# Patient Record
Sex: FEMALE | Race: WHITE
[De-identification: names, ages, dates, MRNs, and addresses within clinical notes are randomized per-mention and may not be internally consistent; named-entity substitution may affect disease eponyms.]

---

## 2017-01-09 ENCOUNTER — APPOINTMENT (OUTPATIENT)
Dept: OPHTHALMOLOGY | Facility: CLINIC | Age: 37
End: 2017-01-09

## 2017-02-10 ENCOUNTER — APPOINTMENT (OUTPATIENT)
Dept: BARIATRICS | Facility: CLINIC | Age: 37
End: 2017-02-10

## 2017-09-14 ENCOUNTER — APPOINTMENT (OUTPATIENT)
Dept: INTERNAL MEDICINE | Facility: CLINIC | Age: 37
End: 2017-09-14

## 2017-09-19 ENCOUNTER — APPOINTMENT (OUTPATIENT)
Dept: ORTHOPEDIC SURGERY | Facility: CLINIC | Age: 37
End: 2017-09-19

## 2018-06-26 ENCOUNTER — APPOINTMENT (OUTPATIENT)
Dept: OBGYN | Facility: CLINIC | Age: 38
End: 2018-06-26
Payer: COMMERCIAL

## 2018-06-26 VITALS
HEIGHT: 66 IN | OXYGEN SATURATION: 99 % | HEART RATE: 59 BPM | WEIGHT: 206 LBS | BODY MASS INDEX: 33.11 KG/M2 | RESPIRATION RATE: 16 BRPM

## 2018-06-26 DIAGNOSIS — Z31.89 ENCOUNTER FOR OTHER PROCREATIVE MANAGEMENT: ICD-10-CM

## 2018-06-26 DIAGNOSIS — N84.1 POLYP OF CERVIX UTERI: ICD-10-CM

## 2018-06-26 PROCEDURE — 57500 BIOPSY OF CERVIX: CPT

## 2018-06-26 PROCEDURE — 99385 PREV VISIT NEW AGE 18-39: CPT | Mod: 25

## 2018-06-26 RX ORDER — BUPROPION HYDROCHLORIDE 100 MG/1
100 TABLET, FILM COATED ORAL
Refills: 0 | Status: ACTIVE | COMMUNITY

## 2018-06-27 LAB
C TRACH RRNA SPEC QL NAA+PROBE: NOT DETECTED
HPV HIGH+LOW RISK DNA PNL CVX: NOT DETECTED
N GONORRHOEA RRNA SPEC QL NAA+PROBE: NOT DETECTED
SOURCE TP AMPLIFICATION: NORMAL

## 2018-06-28 LAB — CORE LAB BIOPSY: NORMAL

## 2018-06-29 ENCOUNTER — OTHER (OUTPATIENT)
Age: 38
End: 2018-06-29

## 2018-06-29 LAB — CYTOLOGY CVX/VAG DOC THIN PREP: NORMAL

## 2018-07-17 ENCOUNTER — APPOINTMENT (OUTPATIENT)
Dept: BARIATRICS | Facility: CLINIC | Age: 38
End: 2018-07-17

## 2018-07-27 ENCOUNTER — APPOINTMENT (OUTPATIENT)
Dept: BARIATRICS | Facility: CLINIC | Age: 38
End: 2018-07-27

## 2018-08-02 ENCOUNTER — APPOINTMENT (OUTPATIENT)
Dept: HUMAN REPRODUCTION | Facility: CLINIC | Age: 38
End: 2018-08-02
Payer: COMMERCIAL

## 2018-08-02 PROCEDURE — 36415 COLL VENOUS BLD VENIPUNCTURE: CPT

## 2018-08-02 PROCEDURE — 99205 OFFICE O/P NEW HI 60 MIN: CPT

## 2018-08-02 PROCEDURE — 76830 TRANSVAGINAL US NON-OB: CPT

## 2018-09-17 ENCOUNTER — APPOINTMENT (OUTPATIENT)
Dept: HUMAN REPRODUCTION | Facility: CLINIC | Age: 38
End: 2018-09-17
Payer: COMMERCIAL

## 2018-09-17 DIAGNOSIS — N97.9 FEMALE INFERTILITY, UNSPECIFIED: ICD-10-CM

## 2018-09-17 PROCEDURE — 99215 OFFICE O/P EST HI 40 MIN: CPT

## 2018-09-27 PROBLEM — N97.9 FEMALE INFERTILITY: Status: ACTIVE | Noted: 2018-09-27

## 2018-11-06 ENCOUNTER — APPOINTMENT (OUTPATIENT)
Dept: BARIATRICS/WEIGHT MGMT | Facility: CLINIC | Age: 38
End: 2018-11-06
Payer: COMMERCIAL

## 2018-11-06 VITALS — HEIGHT: 66 IN | BODY MASS INDEX: 33.75 KG/M2 | WEIGHT: 210 LBS

## 2018-11-06 DIAGNOSIS — E66.9 OBESITY, UNSPECIFIED: ICD-10-CM

## 2018-11-06 DIAGNOSIS — Z98.84 BARIATRIC SURGERY STATUS: ICD-10-CM

## 2018-11-06 PROCEDURE — 97802 MEDICAL NUTRITION INDIV IN: CPT

## 2019-01-29 ENCOUNTER — APPOINTMENT (OUTPATIENT)
Dept: BARIATRICS | Facility: CLINIC | Age: 39
End: 2019-01-29

## 2019-05-14 ENCOUNTER — OUTPATIENT (OUTPATIENT)
Dept: OUTPATIENT SERVICES | Facility: HOSPITAL | Age: 39
LOS: 1 days | Discharge: HOME | End: 2019-05-14

## 2019-05-14 VITALS — WEIGHT: 207.9 LBS | HEIGHT: 65 IN

## 2019-05-14 DIAGNOSIS — E66.01 MORBID (SEVERE) OBESITY DUE TO EXCESS CALORIES: ICD-10-CM

## 2019-05-14 DIAGNOSIS — Z98.84 BARIATRIC SURGERY STATUS: Chronic | ICD-10-CM

## 2019-05-14 DIAGNOSIS — K21.9 GASTRO-ESOPHAGEAL REFLUX DISEASE WITHOUT ESOPHAGITIS: ICD-10-CM

## 2019-05-14 LAB
ANION GAP SERPL CALC-SCNC: 4 MMOL/L — LOW (ref 5–17)
APPEARANCE UR: CLEAR — SIGNIFICANT CHANGE UP
APTT BLD: 32.8 SEC — SIGNIFICANT CHANGE UP (ref 27.5–36.3)
BASOPHILS # BLD AUTO: 0.05 K/UL — SIGNIFICANT CHANGE UP (ref 0–0.2)
BASOPHILS NFR BLD AUTO: 0.9 % — SIGNIFICANT CHANGE UP (ref 0–2)
BILIRUB UR-MCNC: NEGATIVE — SIGNIFICANT CHANGE UP
BUN SERPL-MCNC: 10 MG/DL — SIGNIFICANT CHANGE UP (ref 7–23)
CALCIUM SERPL-MCNC: 8.7 MG/DL — SIGNIFICANT CHANGE UP (ref 8.5–10.1)
CHLORIDE SERPL-SCNC: 105 MMOL/L — SIGNIFICANT CHANGE UP (ref 96–108)
CO2 SERPL-SCNC: 27 MMOL/L — SIGNIFICANT CHANGE UP (ref 22–31)
COLOR SPEC: YELLOW — SIGNIFICANT CHANGE UP
CREAT SERPL-MCNC: 0.76 MG/DL — SIGNIFICANT CHANGE UP (ref 0.5–1.3)
DIFF PNL FLD: NEGATIVE — SIGNIFICANT CHANGE UP
EOSINOPHIL # BLD AUTO: 0.08 K/UL — SIGNIFICANT CHANGE UP (ref 0–0.5)
EOSINOPHIL NFR BLD AUTO: 1.5 % — SIGNIFICANT CHANGE UP (ref 0–6)
GLUCOSE SERPL-MCNC: 81 MG/DL — SIGNIFICANT CHANGE UP (ref 70–99)
GLUCOSE UR QL: NEGATIVE MG/DL — SIGNIFICANT CHANGE UP
HCT VFR BLD CALC: 38.3 % — SIGNIFICANT CHANGE UP (ref 34.5–45)
HGB BLD-MCNC: 13.1 G/DL — SIGNIFICANT CHANGE UP (ref 11.5–15.5)
IMM GRANULOCYTES NFR BLD AUTO: 0.2 % — SIGNIFICANT CHANGE UP (ref 0–1.5)
INR BLD: 1.01 RATIO — SIGNIFICANT CHANGE UP (ref 0.88–1.16)
KETONES UR-MCNC: NEGATIVE — SIGNIFICANT CHANGE UP
LEUKOCYTE ESTERASE UR-ACNC: NEGATIVE — SIGNIFICANT CHANGE UP
LYMPHOCYTES # BLD AUTO: 2.62 K/UL — SIGNIFICANT CHANGE UP (ref 1–3.3)
LYMPHOCYTES # BLD AUTO: 49.2 % — HIGH (ref 13–44)
MCHC RBC-ENTMCNC: 30.5 PG — SIGNIFICANT CHANGE UP (ref 27–34)
MCHC RBC-ENTMCNC: 34.2 GM/DL — SIGNIFICANT CHANGE UP (ref 32–36)
MCV RBC AUTO: 89.1 FL — SIGNIFICANT CHANGE UP (ref 80–100)
MONOCYTES # BLD AUTO: 0.5 K/UL — SIGNIFICANT CHANGE UP (ref 0–0.9)
MONOCYTES NFR BLD AUTO: 9.4 % — SIGNIFICANT CHANGE UP (ref 2–14)
NEUTROPHILS # BLD AUTO: 2.06 K/UL — SIGNIFICANT CHANGE UP (ref 1.8–7.4)
NEUTROPHILS NFR BLD AUTO: 38.8 % — LOW (ref 43–77)
NITRITE UR-MCNC: NEGATIVE — SIGNIFICANT CHANGE UP
PH UR: 8 — SIGNIFICANT CHANGE UP (ref 5–8)
PLATELET # BLD AUTO: 289 K/UL — SIGNIFICANT CHANGE UP (ref 150–400)
POTASSIUM SERPL-MCNC: 4.3 MMOL/L — SIGNIFICANT CHANGE UP (ref 3.5–5.3)
POTASSIUM SERPL-SCNC: 4.3 MMOL/L — SIGNIFICANT CHANGE UP (ref 3.5–5.3)
PROT UR-MCNC: NEGATIVE MG/DL — SIGNIFICANT CHANGE UP
PROTHROM AB SERPL-ACNC: 11.2 SEC — SIGNIFICANT CHANGE UP (ref 10–12.9)
RBC # BLD: 4.3 M/UL — SIGNIFICANT CHANGE UP (ref 3.8–5.2)
RBC # FLD: 13.4 % — SIGNIFICANT CHANGE UP (ref 10.3–14.5)
SODIUM SERPL-SCNC: 136 MMOL/L — SIGNIFICANT CHANGE UP (ref 135–145)
SP GR SPEC: 1.01 — SIGNIFICANT CHANGE UP (ref 1.01–1.02)
UROBILINOGEN FLD QL: NEGATIVE MG/DL — SIGNIFICANT CHANGE UP
WBC # BLD: 5.32 K/UL — SIGNIFICANT CHANGE UP (ref 3.8–10.5)
WBC # FLD AUTO: 5.32 K/UL — SIGNIFICANT CHANGE UP (ref 3.8–10.5)

## 2019-05-14 NOTE — H&P PST ADULT - NSANTHOSAYNRD_GEN_A_CORE
No. ROSHAN screening performed.  STOP BANG Legend: 0-2 = LOW Risk; 3-4 = INTERMEDIATE Risk; 5-8 = HIGH Risk

## 2019-05-14 NOTE — H&P PST ADULT - ASSESSMENT
37 y/o female with history of morbid obesity, DM, S/P sleeve gastrectomy in 2013. Complain of heartburn, regurgitation of bitter acid. Scheduled for upper endoscopy.     Plan:    1. NPO post midnight  2. Labs, EKG as per Dr. Conteh  3. STAT urine pregnancy on admission

## 2019-05-14 NOTE — H&P PST ADULT - NSICDXPASTMEDICALHX_GEN_ALL_CORE_FT
PAST MEDICAL HISTORY:  DM (diabetes mellitus)     GERD (gastroesophageal reflux disease) PAST MEDICAL HISTORY:  DM (diabetes mellitus)     GERD (gastroesophageal reflux disease)     Morbid obesity

## 2019-05-14 NOTE — H&P PST ADULT - HISTORY OF PRESENT ILLNESS
39 y/o female with history of morbid obesity, DM, S/P sleeve gastrectomy in 2013. Complain of heartburn, regurgitation of bitter acid. Scheduled for upper endoscopy.

## 2019-05-14 NOTE — H&P PST ADULT - NSICDXPASTSURGICALHX_GEN_ALL_CORE_FT
PAST SURGICAL HISTORY:       Hyperhydrosis disorder s/p bilat. sympathectomy     S/P Arthroscopic Knee Surgery - right knee ACL and MCL reconstruction  &      S/P laparoscopic sleeve gastrectomy 2013

## 2019-05-24 ENCOUNTER — RESULT REVIEW (OUTPATIENT)
Age: 39
End: 2019-05-24

## 2019-05-24 ENCOUNTER — OUTPATIENT (OUTPATIENT)
Dept: OUTPATIENT SERVICES | Facility: HOSPITAL | Age: 39
LOS: 1 days | Discharge: HOME | End: 2019-05-24
Payer: COMMERCIAL

## 2019-05-24 VITALS
HEIGHT: 65 IN | TEMPERATURE: 98 F | DIASTOLIC BLOOD PRESSURE: 82 MMHG | OXYGEN SATURATION: 100 % | RESPIRATION RATE: 16 BRPM | SYSTOLIC BLOOD PRESSURE: 122 MMHG | WEIGHT: 210.1 LBS | HEART RATE: 49 BPM

## 2019-05-24 DIAGNOSIS — Z98.84 BARIATRIC SURGERY STATUS: Chronic | ICD-10-CM

## 2019-05-24 LAB — HCG UR QL: NEGATIVE — SIGNIFICANT CHANGE UP

## 2019-05-24 PROCEDURE — 88313 SPECIAL STAINS GROUP 2: CPT | Mod: 26

## 2019-05-24 PROCEDURE — 88312 SPECIAL STAINS GROUP 1: CPT | Mod: 26

## 2019-05-24 PROCEDURE — 88305 TISSUE EXAM BY PATHOLOGIST: CPT | Mod: 26

## 2019-05-24 NOTE — ASU PATIENT PROFILE, ADULT - PSH
Hyperhydrosis disorder  s/p bilat. sympathectomy   S/P Arthroscopic Knee Surgery - right knee ACL and MCL reconstruction  &     S/P laparoscopic sleeve gastrectomy  2013

## 2019-05-24 NOTE — ASU PATIENT PROFILE, ADULT - NS PRO ABUSE SCREEN AFRAID ANYONE YN
[facility]  [address] [address]  Phone: [phone]    July 6, 2017      Dorina Lee  [address] [place] [id number]        Dear Ms. Lee    For medical reasons you should be excused from work on 7-6-17 and 7-7-17.     Sincerely,    / Anirudh Gay MD   no

## 2019-05-29 LAB — SURGICAL PATHOLOGY STUDY: SIGNIFICANT CHANGE UP

## 2019-05-30 DIAGNOSIS — E11.9 TYPE 2 DIABETES MELLITUS WITHOUT COMPLICATIONS: ICD-10-CM

## 2019-05-30 DIAGNOSIS — K29.70 GASTRITIS, UNSPECIFIED, WITHOUT BLEEDING: ICD-10-CM

## 2019-05-30 DIAGNOSIS — Z98.84 BARIATRIC SURGERY STATUS: ICD-10-CM

## 2019-05-30 DIAGNOSIS — K21.9 GASTRO-ESOPHAGEAL REFLUX DISEASE WITHOUT ESOPHAGITIS: ICD-10-CM

## 2019-05-30 DIAGNOSIS — E66.01 MORBID (SEVERE) OBESITY DUE TO EXCESS CALORIES: ICD-10-CM

## 2019-05-30 DIAGNOSIS — Z87.891 PERSONAL HISTORY OF NICOTINE DEPENDENCE: ICD-10-CM

## 2019-06-07 PROBLEM — K21.9 GASTRO-ESOPHAGEAL REFLUX DISEASE WITHOUT ESOPHAGITIS: Chronic | Status: ACTIVE | Noted: 2019-05-14

## 2019-07-02 ENCOUNTER — APPOINTMENT (OUTPATIENT)
Dept: PULMONOLOGY | Facility: CLINIC | Age: 39
End: 2019-07-02
Payer: COMMERCIAL

## 2019-07-02 VITALS
HEART RATE: 57 BPM | SYSTOLIC BLOOD PRESSURE: 122 MMHG | WEIGHT: 202 LBS | BODY MASS INDEX: 32.47 KG/M2 | HEIGHT: 66 IN | DIASTOLIC BLOOD PRESSURE: 72 MMHG | OXYGEN SATURATION: 100 %

## 2019-07-02 DIAGNOSIS — Z01.811 ENCOUNTER FOR PREPROCEDURAL RESPIRATORY EXAMINATION: ICD-10-CM

## 2019-07-02 PROCEDURE — 94727 GAS DIL/WSHOT DETER LNG VOL: CPT | Mod: 59

## 2019-07-02 PROCEDURE — 94060 EVALUATION OF WHEEZING: CPT | Mod: 59

## 2019-07-02 PROCEDURE — 99203 OFFICE O/P NEW LOW 30 MIN: CPT | Mod: 25

## 2019-07-02 PROCEDURE — 94729 DIFFUSING CAPACITY: CPT | Mod: 59

## 2019-07-02 RX ORDER — LEUPROLIDE ACETATE 1 MG/0.2ML
1 KIT SUBCUTANEOUS
Qty: 1 | Refills: 1 | Status: DISCONTINUED | COMMUNITY
Start: 2018-09-27 | End: 2019-07-02

## 2019-07-02 RX ORDER — FOLLITROPIN ALFA 1050 UNIT
1050 KIT SUBCUTANEOUS
Qty: 3 | Refills: 1 | Status: DISCONTINUED | COMMUNITY
Start: 2018-09-27 | End: 2019-07-02

## 2019-07-02 RX ORDER — CHORIONIC GONADOTROPIN 10000 UNIT
10000 KIT INTRAMUSCULAR
Qty: 1 | Refills: 0 | Status: DISCONTINUED | COMMUNITY
Start: 2018-09-27 | End: 2019-07-02

## 2019-07-02 RX ORDER — NEEDLES, DISPOSABLE 25GX5/8"
27G X 1/2" NEEDLE, DISPOSABLE MISCELLANEOUS
Qty: 15 | Refills: 0 | Status: DISCONTINUED | COMMUNITY
Start: 2018-09-27 | End: 2019-07-02

## 2019-07-02 RX ORDER — SYRINGE AND NEEDLE,INSULIN,1ML 31 GX5/16"
31G X 5/16" SYRINGE, EMPTY DISPOSABLE MISCELLANEOUS
Qty: 30 | Refills: 0 | Status: DISCONTINUED | COMMUNITY
Start: 2018-09-27 | End: 2019-07-02

## 2019-07-02 RX ORDER — CONTAINER,EMPTY
EACH MISCELLANEOUS
Qty: 1 | Refills: 2 | Status: DISCONTINUED | COMMUNITY
Start: 2018-09-27 | End: 2019-07-02

## 2019-07-02 RX ORDER — SYRINGE WITH NEEDLE, 1 ML 25GX5/8"
22G X 1-1/2" SYRINGE, EMPTY DISPOSABLE MISCELLANEOUS
Qty: 20 | Refills: 1 | Status: DISCONTINUED | COMMUNITY
Start: 2018-09-27 | End: 2019-07-02

## 2019-07-02 RX ORDER — MENOTROPINS 75 UNIT
75 KIT SUBCUTANEOUS
Qty: 40 | Refills: 1 | Status: DISCONTINUED | COMMUNITY
Start: 2018-09-27 | End: 2019-07-02

## 2019-07-02 RX ORDER — DOXYCYCLINE HYCLATE 100 MG/1
100 CAPSULE ORAL TWICE DAILY
Qty: 10 | Refills: 0 | Status: DISCONTINUED | COMMUNITY
Start: 2018-09-27 | End: 2019-07-02

## 2019-07-02 NOTE — REASON FOR VISIT
[Consultation] : a consultation visit [FreeTextEntry1] : Preoperative evaluation prior to bariatric surgery

## 2019-07-02 NOTE — REVIEW OF SYSTEMS
[Recent Wt Gain (___ Lbs)] : recent [unfilled] ~Ulb weight gain [Reflux] : reflux [Negative] : Sleep Disorder

## 2019-07-02 NOTE — DISCUSSION/SUMMARY
[FreeTextEntry1] : Based on the history, examination and pulmonary function testing the patient is cleared for the proposed bariatric surgery from pulmonary point of view.

## 2019-07-02 NOTE — HISTORY OF PRESENT ILLNESS
[FreeTextEntry1] : The patient is a 38-year-old lady with history of obesity here for preoperative evaluation prior to bariatric surgery. The patient has history of having had a prior bariatric surgery. Recently she's been gaining the weight back.\par The patient smoked half pack a day for 8 years and quit smoking in 2007. She is a runner. She runs 4 miles a day. There is no history of shortness of breath or cough.\par She has gained about 30 pounds of weight in the last 3-4 years.\par The patient sleeps for about 6-7 hours every night. There is no history of snoring. There is no history of witnessed apnea. There is history of sleep talking.\par The patient has history significant for gastroesophageal reflux disease.\par She works as an  for the local hospital.

## 2019-07-02 NOTE — PHYSICAL EXAM
[General Appearance - Well Developed] : well developed [Normal Appearance] : normal appearance [Well Groomed] : well groomed [General Appearance - Well Nourished] : well nourished [No Deformities] : no deformities [General Appearance - In No Acute Distress] : no acute distress [Normal Conjunctiva] : the conjunctiva exhibited no abnormalities [Eyelids - No Xanthelasma] : the eyelids demonstrated no xanthelasmas [Normal Oropharynx] : normal oropharynx [Neck Appearance] : the appearance of the neck was normal [Neck Cervical Mass (___cm)] : no neck mass was observed [Jugular Venous Distention Increased] : there was no jugular-venous distention [Thyroid Diffuse Enlargement] : the thyroid was not enlarged [Thyroid Nodule] : there were no palpable thyroid nodules [Heart Rate And Rhythm] : heart rate and rhythm were normal [Heart Sounds] : normal S1 and S2 [Murmurs] : no murmurs present [Respiration, Rhythm And Depth] : normal respiratory rhythm and effort [Exaggerated Use Of Accessory Muscles For Inspiration] : no accessory muscle use [Auscultation Breath Sounds / Voice Sounds] : lungs were clear to auscultation bilaterally [Abdomen Soft] : soft [Abdomen Tenderness] : non-tender [Abdomen Mass (___ Cm)] : no abdominal mass palpated [Abnormal Walk] : normal gait [Gait - Sufficient For Exercise Testing] : the gait was sufficient for exercise testing [Nail Clubbing] : no clubbing of the fingernails [Cyanosis, Localized] : no localized cyanosis [Petechial Hemorrhages (___cm)] : no petechial hemorrhages [Skin Color & Pigmentation] : normal skin color and pigmentation [Skin Turgor] : normal skin turgor [] : no rash [Deep Tendon Reflexes (DTR)] : deep tendon reflexes were 2+ and symmetric [No Focal Deficits] : no focal deficits [Sensation] : the sensory exam was normal to light touch and pinprick [Oriented To Time, Place, And Person] : oriented to person, place, and time [Impaired Insight] : insight and judgment were intact [Affect] : the affect was normal

## 2019-08-02 ENCOUNTER — OUTPATIENT (OUTPATIENT)
Dept: OUTPATIENT SERVICES | Facility: HOSPITAL | Age: 39
LOS: 1 days | End: 2019-08-02
Payer: COMMERCIAL

## 2019-08-02 VITALS
HEIGHT: 65.5 IN | WEIGHT: 195.99 LBS | OXYGEN SATURATION: 99 % | TEMPERATURE: 98 F | HEART RATE: 76 BPM | SYSTOLIC BLOOD PRESSURE: 120 MMHG | RESPIRATION RATE: 14 BRPM | DIASTOLIC BLOOD PRESSURE: 78 MMHG

## 2019-08-02 DIAGNOSIS — E66.01 MORBID (SEVERE) OBESITY DUE TO EXCESS CALORIES: ICD-10-CM

## 2019-08-02 DIAGNOSIS — Z98.890 OTHER SPECIFIED POSTPROCEDURAL STATES: Chronic | ICD-10-CM

## 2019-08-02 DIAGNOSIS — Z29.9 ENCOUNTER FOR PROPHYLACTIC MEASURES, UNSPECIFIED: ICD-10-CM

## 2019-08-02 DIAGNOSIS — Z98.84 BARIATRIC SURGERY STATUS: Chronic | ICD-10-CM

## 2019-08-02 LAB
ALBUMIN SERPL ELPH-MCNC: 4.2 G/DL — SIGNIFICANT CHANGE UP (ref 3.3–5)
ANION GAP SERPL CALC-SCNC: 5 MMOL/L — SIGNIFICANT CHANGE UP (ref 5–17)
APPEARANCE UR: CLEAR — SIGNIFICANT CHANGE UP
APTT BLD: 34.3 SEC — SIGNIFICANT CHANGE UP (ref 27.5–36.3)
BASOPHILS # BLD AUTO: 0.06 K/UL — SIGNIFICANT CHANGE UP (ref 0–0.2)
BASOPHILS NFR BLD AUTO: 0.9 % — SIGNIFICANT CHANGE UP (ref 0–2)
BILIRUB UR-MCNC: NEGATIVE — SIGNIFICANT CHANGE UP
BUN SERPL-MCNC: 12 MG/DL — SIGNIFICANT CHANGE UP (ref 7–23)
CALCIUM SERPL-MCNC: 9.7 MG/DL — SIGNIFICANT CHANGE UP (ref 8.5–10.1)
CHLORIDE SERPL-SCNC: 105 MMOL/L — SIGNIFICANT CHANGE UP (ref 96–108)
CO2 SERPL-SCNC: 29 MMOL/L — SIGNIFICANT CHANGE UP (ref 22–31)
COHGB MFR BLDV: 2 % — HIGH (ref 0–1.5)
COLOR SPEC: YELLOW — SIGNIFICANT CHANGE UP
CREAT SERPL-MCNC: 0.75 MG/DL — SIGNIFICANT CHANGE UP (ref 0.5–1.3)
DIFF PNL FLD: NEGATIVE — SIGNIFICANT CHANGE UP
EOSINOPHIL # BLD AUTO: 0.1 K/UL — SIGNIFICANT CHANGE UP (ref 0–0.5)
EOSINOPHIL NFR BLD AUTO: 1.4 % — SIGNIFICANT CHANGE UP (ref 0–6)
GLUCOSE SERPL-MCNC: 84 MG/DL — SIGNIFICANT CHANGE UP (ref 70–99)
GLUCOSE UR QL: NEGATIVE MG/DL — SIGNIFICANT CHANGE UP
HBA1C BLD-MCNC: 5.1 % — SIGNIFICANT CHANGE UP (ref 4–5.6)
HCT VFR BLD CALC: 38.7 % — SIGNIFICANT CHANGE UP (ref 34.5–45)
HGB BLD-MCNC: 13.4 G/DL — SIGNIFICANT CHANGE UP (ref 11.5–15.5)
IMM GRANULOCYTES NFR BLD AUTO: 0.3 % — SIGNIFICANT CHANGE UP (ref 0–1.5)
INR BLD: 1.05 RATIO — SIGNIFICANT CHANGE UP (ref 0.88–1.16)
KETONES UR-MCNC: ABNORMAL
LEUKOCYTE ESTERASE UR-ACNC: NEGATIVE — SIGNIFICANT CHANGE UP
LYMPHOCYTES # BLD AUTO: 3.19 K/UL — SIGNIFICANT CHANGE UP (ref 1–3.3)
LYMPHOCYTES # BLD AUTO: 46.2 % — HIGH (ref 13–44)
MCHC RBC-ENTMCNC: 30.6 PG — SIGNIFICANT CHANGE UP (ref 27–34)
MCHC RBC-ENTMCNC: 34.6 GM/DL — SIGNIFICANT CHANGE UP (ref 32–36)
MCV RBC AUTO: 88.4 FL — SIGNIFICANT CHANGE UP (ref 80–100)
MONOCYTES # BLD AUTO: 0.61 K/UL — SIGNIFICANT CHANGE UP (ref 0–0.9)
MONOCYTES NFR BLD AUTO: 8.8 % — SIGNIFICANT CHANGE UP (ref 2–14)
NEUTROPHILS # BLD AUTO: 2.92 K/UL — SIGNIFICANT CHANGE UP (ref 1.8–7.4)
NEUTROPHILS NFR BLD AUTO: 42.4 % — LOW (ref 43–77)
NITRITE UR-MCNC: NEGATIVE — SIGNIFICANT CHANGE UP
PH UR: 8 — SIGNIFICANT CHANGE UP (ref 5–8)
PLATELET # BLD AUTO: 317 K/UL — SIGNIFICANT CHANGE UP (ref 150–400)
POTASSIUM SERPL-MCNC: 4.8 MMOL/L — SIGNIFICANT CHANGE UP (ref 3.5–5.3)
POTASSIUM SERPL-SCNC: 4.8 MMOL/L — SIGNIFICANT CHANGE UP (ref 3.5–5.3)
PROT UR-MCNC: NEGATIVE MG/DL — SIGNIFICANT CHANGE UP
PROTHROM AB SERPL-ACNC: 11.7 SEC — SIGNIFICANT CHANGE UP (ref 10–12.9)
RBC # BLD: 4.38 M/UL — SIGNIFICANT CHANGE UP (ref 3.8–5.2)
RBC # FLD: 13.6 % — SIGNIFICANT CHANGE UP (ref 10.3–14.5)
SODIUM SERPL-SCNC: 139 MMOL/L — SIGNIFICANT CHANGE UP (ref 135–145)
SP GR SPEC: 1.01 — SIGNIFICANT CHANGE UP (ref 1.01–1.02)
UROBILINOGEN FLD QL: NEGATIVE MG/DL — SIGNIFICANT CHANGE UP
WBC # BLD: 6.9 K/UL — SIGNIFICANT CHANGE UP (ref 3.8–10.5)
WBC # FLD AUTO: 6.9 K/UL — SIGNIFICANT CHANGE UP (ref 3.8–10.5)

## 2019-08-02 PROCEDURE — 71046 X-RAY EXAM CHEST 2 VIEWS: CPT | Mod: 26

## 2019-08-02 RX ORDER — METFORMIN HYDROCHLORIDE 850 MG/1
1 TABLET ORAL
Qty: 0 | Refills: 0 | DISCHARGE

## 2019-08-02 NOTE — H&P PST ADULT - HISTORY OF PRESENT ILLNESS
37 y/o female  presents to PST. Reports h/o gastric sleeve 2013 and has kept off 75 lbs. Reports "she didn't do it tight enough" & is coming in for revision with Dr Conteh Reports being on liquid diet x 6 days & having diarrhea. Denies n/v or abdominal pain.

## 2019-08-02 NOTE — H&P PST ADULT - ASSESSMENT
37 yo female scheduled for laparoscopic sleeve gastrectomy on 19  with Dr. montero    1. Labs as per surgeon  2. EKG- copy on chart from PCP  3. Medical evaluation with PCP Dr Gong  4. discussed EZ sponges, NPO after MN & PST day of procedure instructions  5. Instructed to increase po fluids the day before surgery. Instructed to hold aspirin, NSAIDs, vitamins & herbal supplements 7 days prior to surgery  6. CXR  7. stat urine pregnancy on admit    CAPRINI SCORE    AGE RELATED RISK FACTORS                                                       MOBILITY RELATED FACTORS  [ ] Age 41-60 years                                            (1 Point)                  [ ] Bed rest                                                        (1 Point)  [ ] Age: 61-74 years                                           (2 Points)                [ ] Plaster cast                                                   (2 Points)  [ ] Age= 75 years                                              (3 Points)                 [ ] Bed bound for more than 72 hours                   (2 Points)    DISEASE RELATED RISK FACTORS                                               GENDER SPECIFIC FACTORS  [ ] Edema in the lower extremities                       (1 Point)                  [ ] Pregnancy                                                     (1 Point)  [ ] Varicose veins                                               (1 Point)                  [ ] Post-partum < 6 weeks                                   (1 Point)             [x ] BMI > 25 Kg/m2                                            (1 Point)                  [ ] Hormonal therapy  or oral contraception            (1 Point)                 [ ] Sepsis (in the previous month)                        (1 Point)                  [ ] History of pregnancy complications  [ ] Pneumonia or serious lung disease                                               [ ] Unexplained or recurrent                       (1 Point)           (in the previous month)                               (1 Point)  [ ] Abnormal pulmonary function test                     (1 Point)                 SURGERY RELATED RISK FACTORS  [ ] Acute myocardial infarction                              (1 Point)                 [ ]  Section                                            (1 Point)  [ ] Congestive heart failure (in the previous month)  (1 Point)                 [ ] Minor surgery                                                 (1 Point)   [ ] Inflammatory bowel disease                             (1 Point)                 [ ] Arthroscopic surgery                                        (2 Points)  [ ] Central venous access                                    (2 Points)                [x ] General surgery lasting more than 45 minutes   (2 Points)       [ ] Stroke (in the previous month)                          (5 Points)               [ ] Elective arthroplasty                                        (5 Points)            [  ] cancer/malignancy                                        (2 points)                                                                                                HEMATOLOGY RELATED FACTORS                                                 TRAUMA RELATED RISK FACTORS  [ ] Prior episodes of VTE                                     (3 Points)                 [ ] Fracture of the hip, pelvis, or leg                       (5 Points)  [ ] Positive family history for VTE                         (3 Points)                 [ ] Acute spinal cord injury (in the previous month)  (5 Points)  [ ] Prothrombin 18286 A                                      (3 Points)                 [ ] Paralysis  (less than 1 month)                          (5 Points)  [ ] Factor V Leiden                                             (3 Points)                 [ ] Multiple Trauma within 1 month                         (5 Points)  [ ] Lupus anticoagulants                                     (3 Points)                                                           [ ] Anticardiolipin antibodies                                (3 Points)                                                       [ ] High homocysteine in the blood                      (3 Points)                                             [ ] Other congenital or acquired thrombophilia       (3 Points)                                                [ ] Heparin induced thrombocytopenia                  (3 Points)                                          Total Score [   3       ]

## 2019-08-02 NOTE — H&P PST ADULT - NSICDXPASTSURGICALHX_GEN_ALL_CORE_FT
PAST SURGICAL HISTORY:       History of esophagogastroduodenoscopy (EGD) 2019    Hyperhydrosis disorder s/p bilat. sympathectomy     S/P Arthroscopic Knee Surgery - right knee ACL and MCL reconstruction  &      S/P laparoscopic sleeve gastrectomy 2013

## 2019-08-02 NOTE — H&P PST ADULT - NSICDXPASTMEDICALHX_GEN_ALL_CORE_FT
PAST MEDICAL HISTORY:  Anxiety and depression     DM (diabetes mellitus)     GERD (gastroesophageal reflux disease)     Morbid obesity

## 2019-08-02 NOTE — H&P PST ADULT - NSICDXPROBLEM_GEN_ALL_CORE_FT
PROBLEM DIAGNOSES  Problem: Need for prophylactic measure  Assessment and Plan:     The Caprini score indicates this patient is at risk for a VTE event (score 3-5).  Most surgical patients in this group would benefit from pharmacologic prophylaxis.  The surgical team will determine the balance between VTE risk and bleeding risk

## 2019-08-10 RX ORDER — OXYCODONE HYDROCHLORIDE 5 MG/1
10 TABLET ORAL ONCE
Refills: 0 | Status: DISCONTINUED | OUTPATIENT
Start: 2019-08-12 | End: 2019-08-14

## 2019-08-10 RX ORDER — SODIUM CHLORIDE 9 MG/ML
1000 INJECTION, SOLUTION INTRAVENOUS
Refills: 0 | Status: DISCONTINUED | OUTPATIENT
Start: 2019-08-12 | End: 2019-08-14

## 2019-08-10 RX ORDER — FENTANYL CITRATE 50 UG/ML
50 INJECTION INTRAVENOUS
Refills: 0 | Status: DISCONTINUED | OUTPATIENT
Start: 2019-08-12 | End: 2019-08-14

## 2019-08-10 RX ORDER — HYDROMORPHONE HYDROCHLORIDE 2 MG/ML
0.5 INJECTION INTRAMUSCULAR; INTRAVENOUS; SUBCUTANEOUS
Refills: 0 | Status: DISCONTINUED | OUTPATIENT
Start: 2019-08-12 | End: 2019-08-13

## 2019-08-12 ENCOUNTER — RESULT REVIEW (OUTPATIENT)
Age: 39
End: 2019-08-12

## 2019-08-12 ENCOUNTER — INPATIENT (INPATIENT)
Facility: HOSPITAL | Age: 39
LOS: 1 days | Discharge: HOME | DRG: 621 | End: 2019-08-14
Attending: SURGERY | Admitting: SURGERY
Payer: COMMERCIAL

## 2019-08-12 VITALS
HEART RATE: 60 BPM | OXYGEN SATURATION: 100 % | WEIGHT: 190.7 LBS | RESPIRATION RATE: 16 BRPM | TEMPERATURE: 98 F | DIASTOLIC BLOOD PRESSURE: 80 MMHG | HEIGHT: 65.5 IN | SYSTOLIC BLOOD PRESSURE: 127 MMHG

## 2019-08-12 DIAGNOSIS — E66.01 MORBID (SEVERE) OBESITY DUE TO EXCESS CALORIES: ICD-10-CM

## 2019-08-12 DIAGNOSIS — Z98.84 BARIATRIC SURGERY STATUS: Chronic | ICD-10-CM

## 2019-08-12 DIAGNOSIS — Z98.890 OTHER SPECIFIED POSTPROCEDURAL STATES: Chronic | ICD-10-CM

## 2019-08-12 LAB — HCG UR QL: NEGATIVE — SIGNIFICANT CHANGE UP

## 2019-08-12 PROCEDURE — 81025 URINE PREGNANCY TEST: CPT

## 2019-08-12 PROCEDURE — C1889: CPT

## 2019-08-12 PROCEDURE — 80048 BASIC METABOLIC PNL TOTAL CA: CPT

## 2019-08-12 PROCEDURE — 88307 TISSUE EXAM BY PATHOLOGIST: CPT | Mod: 26

## 2019-08-12 PROCEDURE — C9113: CPT

## 2019-08-12 PROCEDURE — 85025 COMPLETE CBC W/AUTO DIFF WBC: CPT

## 2019-08-12 PROCEDURE — 88307 TISSUE EXAM BY PATHOLOGIST: CPT

## 2019-08-12 PROCEDURE — 36415 COLL VENOUS BLD VENIPUNCTURE: CPT

## 2019-08-12 RX ORDER — APREPITANT 80 MG/1
80 CAPSULE ORAL ONCE
Refills: 0 | Status: COMPLETED | OUTPATIENT
Start: 2019-08-12 | End: 2019-08-12

## 2019-08-12 RX ORDER — ACETAMINOPHEN 500 MG
1000 TABLET ORAL ONCE
Refills: 0 | Status: COMPLETED | OUTPATIENT
Start: 2019-08-12 | End: 2019-08-12

## 2019-08-12 RX ORDER — PROCHLORPERAZINE MALEATE 5 MG
10 TABLET ORAL ONCE
Refills: 0 | Status: COMPLETED | OUTPATIENT
Start: 2019-08-12 | End: 2019-08-12

## 2019-08-12 RX ORDER — SODIUM CHLORIDE 9 MG/ML
1000 INJECTION, SOLUTION INTRAVENOUS
Refills: 0 | Status: DISCONTINUED | OUTPATIENT
Start: 2019-08-12 | End: 2019-08-14

## 2019-08-12 RX ORDER — OXYCODONE HYDROCHLORIDE 5 MG/1
10 TABLET ORAL ONCE
Refills: 0 | Status: DISCONTINUED | OUTPATIENT
Start: 2019-08-12 | End: 2019-08-12

## 2019-08-12 RX ORDER — HEPARIN SODIUM 5000 [USP'U]/ML
5000 INJECTION INTRAVENOUS; SUBCUTANEOUS ONCE
Refills: 0 | Status: COMPLETED | OUTPATIENT
Start: 2019-08-12 | End: 2019-08-12

## 2019-08-12 RX ORDER — HYDROMORPHONE HYDROCHLORIDE 2 MG/ML
0.5 INJECTION INTRAMUSCULAR; INTRAVENOUS; SUBCUTANEOUS
Refills: 0 | Status: DISCONTINUED | OUTPATIENT
Start: 2019-08-12 | End: 2019-08-13

## 2019-08-12 RX ORDER — NALOXONE HYDROCHLORIDE 4 MG/.1ML
0.1 SPRAY NASAL
Refills: 0 | Status: DISCONTINUED | OUTPATIENT
Start: 2019-08-12 | End: 2019-08-14

## 2019-08-12 RX ORDER — ONDANSETRON 8 MG/1
4 TABLET, FILM COATED ORAL EVERY 6 HOURS
Refills: 0 | Status: DISCONTINUED | OUTPATIENT
Start: 2019-08-12 | End: 2019-08-14

## 2019-08-12 RX ORDER — ENOXAPARIN SODIUM 100 MG/ML
40 INJECTION SUBCUTANEOUS EVERY 12 HOURS
Refills: 0 | Status: DISCONTINUED | OUTPATIENT
Start: 2019-08-12 | End: 2019-08-14

## 2019-08-12 RX ORDER — SODIUM CHLORIDE 9 MG/ML
1000 INJECTION, SOLUTION INTRAVENOUS ONCE
Refills: 0 | Status: COMPLETED | OUTPATIENT
Start: 2019-08-12 | End: 2019-08-12

## 2019-08-12 RX ORDER — HYDROMORPHONE HYDROCHLORIDE 2 MG/ML
30 INJECTION INTRAMUSCULAR; INTRAVENOUS; SUBCUTANEOUS
Refills: 0 | Status: DISCONTINUED | OUTPATIENT
Start: 2019-08-12 | End: 2019-08-13

## 2019-08-12 RX ORDER — PANTOPRAZOLE SODIUM 20 MG/1
40 TABLET, DELAYED RELEASE ORAL EVERY 24 HOURS
Refills: 0 | Status: DISCONTINUED | OUTPATIENT
Start: 2019-08-12 | End: 2019-08-14

## 2019-08-12 RX ADMIN — HYDROMORPHONE HYDROCHLORIDE 30 MILLILITER(S): 2 INJECTION INTRAMUSCULAR; INTRAVENOUS; SUBCUTANEOUS at 09:58

## 2019-08-12 RX ADMIN — APREPITANT 80 MILLIGRAM(S): 80 CAPSULE ORAL at 07:08

## 2019-08-12 RX ADMIN — SODIUM CHLORIDE 150 MILLILITER(S): 9 INJECTION, SOLUTION INTRAVENOUS at 09:45

## 2019-08-12 RX ADMIN — OXYCODONE HYDROCHLORIDE 10 MILLIGRAM(S): 5 TABLET ORAL at 07:08

## 2019-08-12 RX ADMIN — SODIUM CHLORIDE 150 MILLILITER(S): 9 INJECTION, SOLUTION INTRAVENOUS at 13:48

## 2019-08-12 RX ADMIN — ENOXAPARIN SODIUM 40 MILLIGRAM(S): 100 INJECTION SUBCUTANEOUS at 18:22

## 2019-08-12 RX ADMIN — PANTOPRAZOLE SODIUM 40 MILLIGRAM(S): 20 TABLET, DELAYED RELEASE ORAL at 09:45

## 2019-08-12 RX ADMIN — Medication 10 MILLIGRAM(S): at 09:35

## 2019-08-12 RX ADMIN — SODIUM CHLORIDE 1000 MILLILITER(S): 9 INJECTION, SOLUTION INTRAVENOUS at 18:57

## 2019-08-12 RX ADMIN — Medication 1000 MILLIGRAM(S): at 14:00

## 2019-08-12 RX ADMIN — HYDROMORPHONE HYDROCHLORIDE 30 MILLILITER(S): 2 INJECTION INTRAMUSCULAR; INTRAVENOUS; SUBCUTANEOUS at 09:44

## 2019-08-12 RX ADMIN — HEPARIN SODIUM 5000 UNIT(S): 5000 INJECTION INTRAVENOUS; SUBCUTANEOUS at 07:08

## 2019-08-12 RX ADMIN — Medication 400 MILLIGRAM(S): at 13:47

## 2019-08-12 NOTE — BRIEF OPERATIVE NOTE - OPERATION/FINDINGS
Patient underwent laparoscopic revision of vertical sleeve gastrectomy over 38 [place] bougie without any complications. Intraoperative EGD confirmed intact nonbleeding staple line with negative leak test.

## 2019-08-12 NOTE — BRIEF OPERATIVE NOTE - NSICDXBRIEFPROCEDURE_GEN_ALL_CORE_FT
PROCEDURES:  EGD 12-Aug-2019 08:33:30  Francis Manzano  Laparoscopic sleeve gastrectomy 12-Aug-2019 08:33:18  Francis Manzano

## 2019-08-13 LAB
ANION GAP SERPL CALC-SCNC: 7 MMOL/L — SIGNIFICANT CHANGE UP (ref 5–17)
BASOPHILS # BLD AUTO: 0.04 K/UL — SIGNIFICANT CHANGE UP (ref 0–0.2)
BASOPHILS NFR BLD AUTO: 0.4 % — SIGNIFICANT CHANGE UP (ref 0–2)
BUN SERPL-MCNC: 5 MG/DL — LOW (ref 7–23)
CALCIUM SERPL-MCNC: 9 MG/DL — SIGNIFICANT CHANGE UP (ref 8.5–10.1)
CHLORIDE SERPL-SCNC: 105 MMOL/L — SIGNIFICANT CHANGE UP (ref 96–108)
CO2 SERPL-SCNC: 25 MMOL/L — SIGNIFICANT CHANGE UP (ref 22–31)
CREAT SERPL-MCNC: 0.74 MG/DL — SIGNIFICANT CHANGE UP (ref 0.5–1.3)
EOSINOPHIL # BLD AUTO: 0.02 K/UL — SIGNIFICANT CHANGE UP (ref 0–0.5)
EOSINOPHIL NFR BLD AUTO: 0.2 % — SIGNIFICANT CHANGE UP (ref 0–6)
GLUCOSE SERPL-MCNC: 83 MG/DL — SIGNIFICANT CHANGE UP (ref 70–99)
HCT VFR BLD CALC: 33.2 % — LOW (ref 34.5–45)
HGB BLD-MCNC: 11.3 G/DL — LOW (ref 11.5–15.5)
IMM GRANULOCYTES NFR BLD AUTO: 0.2 % — SIGNIFICANT CHANGE UP (ref 0–1.5)
LYMPHOCYTES # BLD AUTO: 27.5 % — SIGNIFICANT CHANGE UP (ref 13–44)
LYMPHOCYTES # BLD AUTO: 3.02 K/UL — SIGNIFICANT CHANGE UP (ref 1–3.3)
MCHC RBC-ENTMCNC: 30.4 PG — SIGNIFICANT CHANGE UP (ref 27–34)
MCHC RBC-ENTMCNC: 34 GM/DL — SIGNIFICANT CHANGE UP (ref 32–36)
MCV RBC AUTO: 89.2 FL — SIGNIFICANT CHANGE UP (ref 80–100)
MONOCYTES # BLD AUTO: 1.04 K/UL — HIGH (ref 0–0.9)
MONOCYTES NFR BLD AUTO: 9.5 % — SIGNIFICANT CHANGE UP (ref 2–14)
NEUTROPHILS # BLD AUTO: 6.86 K/UL — SIGNIFICANT CHANGE UP (ref 1.8–7.4)
NEUTROPHILS NFR BLD AUTO: 62.2 % — SIGNIFICANT CHANGE UP (ref 43–77)
PLATELET # BLD AUTO: 254 K/UL — SIGNIFICANT CHANGE UP (ref 150–400)
POTASSIUM SERPL-MCNC: 3.9 MMOL/L — SIGNIFICANT CHANGE UP (ref 3.5–5.3)
POTASSIUM SERPL-SCNC: 3.9 MMOL/L — SIGNIFICANT CHANGE UP (ref 3.5–5.3)
RBC # BLD: 3.72 M/UL — LOW (ref 3.8–5.2)
RBC # FLD: 13.7 % — SIGNIFICANT CHANGE UP (ref 10.3–14.5)
SODIUM SERPL-SCNC: 137 MMOL/L — SIGNIFICANT CHANGE UP (ref 135–145)
WBC # BLD: 11 K/UL — HIGH (ref 3.8–10.5)
WBC # FLD AUTO: 11 K/UL — HIGH (ref 3.8–10.5)

## 2019-08-13 PROCEDURE — 74241: CPT | Mod: 26

## 2019-08-13 RX ORDER — OXYCODONE HYDROCHLORIDE 5 MG/1
10 TABLET ORAL EVERY 4 HOURS
Refills: 0 | Status: DISCONTINUED | OUTPATIENT
Start: 2019-08-13 | End: 2019-08-14

## 2019-08-13 RX ORDER — KETOROLAC TROMETHAMINE 30 MG/ML
30 SYRINGE (ML) INJECTION EVERY 6 HOURS
Refills: 0 | Status: DISCONTINUED | OUTPATIENT
Start: 2019-08-13 | End: 2019-08-14

## 2019-08-13 RX ORDER — OXYCODONE HYDROCHLORIDE 5 MG/1
5 TABLET ORAL EVERY 4 HOURS
Refills: 0 | Status: DISCONTINUED | OUTPATIENT
Start: 2019-08-13 | End: 2019-08-14

## 2019-08-13 RX ADMIN — Medication 30 MILLIGRAM(S): at 17:18

## 2019-08-13 RX ADMIN — ENOXAPARIN SODIUM 40 MILLIGRAM(S): 100 INJECTION SUBCUTANEOUS at 17:26

## 2019-08-13 RX ADMIN — Medication 30 MILLIGRAM(S): at 17:34

## 2019-08-13 RX ADMIN — SODIUM CHLORIDE 150 MILLILITER(S): 9 INJECTION, SOLUTION INTRAVENOUS at 09:48

## 2019-08-13 RX ADMIN — ENOXAPARIN SODIUM 40 MILLIGRAM(S): 100 INJECTION SUBCUTANEOUS at 05:02

## 2019-08-13 RX ADMIN — Medication 30 MILLIGRAM(S): at 11:43

## 2019-08-13 RX ADMIN — Medication 30 MILLIGRAM(S): at 11:28

## 2019-08-13 RX ADMIN — Medication 30 MILLIGRAM(S): at 23:34

## 2019-08-13 RX ADMIN — PANTOPRAZOLE SODIUM 40 MILLIGRAM(S): 20 TABLET, DELAYED RELEASE ORAL at 09:49

## 2019-08-13 NOTE — PROGRESS NOTE ADULT - ASSESSMENT
s/p lap revision of sleeve gastrectomy    The patient is status post laparoscopic sleeve gastrectomy and doing very well.    The patient will have an upper G.I. series this morning, if it shows no leak or stricture, will start gastric bypass clears.  Ambulation.  Pain control.  Incentive spirometer.

## 2019-08-13 NOTE — PROGRESS NOTE ADULT - SUBJECTIVE AND OBJECTIVE BOX
Post Op Day#: 1  Procedure:  Laparoscopic Sleeve Gastrectomy    The patient is doing well without complaints.    Vital Signs Last 24 Hrs  T(C): 36.9 (13 Aug 2019 08:17), Max: 36.9 (13 Aug 2019 08:17)  T(F): 98.4 (13 Aug 2019 08:17), Max: 98.4 (13 Aug 2019 08:17)  HR: 65 (13 Aug 2019 08:17) (56 - 65)  BP: 121/69 (13 Aug 2019 08:17) (116/68 - 143/63)  BP(mean): 7 (12 Aug 2019 09:15) (7 - 7)  RR: 17 (13 Aug 2019 08:17) (14 - 20)  SpO2: 100% (13 Aug 2019 08:17) (98% - 100%)    PHYSICAL EXAM:  General: NAD.  HEENT: no JVD, no jaundice.  LUNGS: CTAB.  Heart: S1 S2 RRR  Abd: soft nt/nd   Wounds: clean dry and intact

## 2019-08-14 ENCOUNTER — TRANSCRIPTION ENCOUNTER (OUTPATIENT)
Age: 39
End: 2019-08-14

## 2019-08-14 VITALS
DIASTOLIC BLOOD PRESSURE: 79 MMHG | HEART RATE: 56 BPM | TEMPERATURE: 98 F | SYSTOLIC BLOOD PRESSURE: 114 MMHG | OXYGEN SATURATION: 100 % | RESPIRATION RATE: 17 BRPM

## 2019-08-14 RX ADMIN — Medication 30 MILLIGRAM(S): at 06:00

## 2019-08-14 RX ADMIN — PANTOPRAZOLE SODIUM 40 MILLIGRAM(S): 20 TABLET, DELAYED RELEASE ORAL at 08:55

## 2019-08-14 RX ADMIN — Medication 30 MILLIGRAM(S): at 05:43

## 2019-08-14 RX ADMIN — Medication 30 MILLIGRAM(S): at 00:10

## 2019-08-14 RX ADMIN — ENOXAPARIN SODIUM 40 MILLIGRAM(S): 100 INJECTION SUBCUTANEOUS at 05:42

## 2019-08-14 NOTE — PROGRESS NOTE ADULT - ASSESSMENT
Laparoscopic Sleeve Gastrectomy  The patient is s/p lap sleeve gastrectomy and doing very well.  All discharge instructions were given to the patient, as well as potential signs of complications.  The patient will follow up in 2 weeks.  [facility]

## 2019-08-14 NOTE — DISCHARGE NOTE NURSING/CASE MANAGEMENT/SOCIAL WORK - NSDCDPATPORTLINK_GEN_ALL_CORE
You can access the [facility] Portal, offered by [facility], by registering with the following website: [email or web address]

## 2019-08-14 NOTE — DISCHARGE NOTE PROVIDER - CARE PROVIDER_API CALL
Hair Conteh)  Surgery  [address], [address]  [place] [address]  Phone: [phone]  Fax: [phone]  Follow Up Time:

## 2019-08-14 NOTE — DISCHARGE NOTE NURSING/CASE MANAGEMENT/SOCIAL WORK - NSDCPNINST_GEN_ALL_CORE
Return to ER or call MD if you experience fever greater than 101, nausea with vomiting, opening or redness of incision sites. Continue medications as prescribed and follow up with doctors as instructed.

## 2019-08-16 DIAGNOSIS — E11.65 TYPE 2 DIABETES MELLITUS WITH HYPERGLYCEMIA: ICD-10-CM

## 2019-08-16 DIAGNOSIS — Z98.84 BARIATRIC SURGERY STATUS: ICD-10-CM

## 2019-08-16 DIAGNOSIS — E66.01 MORBID (SEVERE) OBESITY DUE TO EXCESS CALORIES: ICD-10-CM

## 2020-10-13 PROBLEM — F41.9 ANXIETY DISORDER, UNSPECIFIED: Chronic | Status: ACTIVE | Noted: 2019-08-02

## 2020-10-16 ENCOUNTER — APPOINTMENT (OUTPATIENT)
Dept: OBGYN | Facility: CLINIC | Age: 40
End: 2020-10-16
Payer: COMMERCIAL

## 2020-10-16 VITALS
HEIGHT: 66 IN | SYSTOLIC BLOOD PRESSURE: 143 MMHG | DIASTOLIC BLOOD PRESSURE: 77 MMHG | TEMPERATURE: 98.6 F | BODY MASS INDEX: 24.11 KG/M2 | HEART RATE: 61 BPM | WEIGHT: 150 LBS

## 2020-10-16 DIAGNOSIS — Z01.419 ENCOUNTER FOR GYNECOLOGICAL EXAMINATION (GENERAL) (ROUTINE) W/OUT ABNORMAL FINDINGS: ICD-10-CM

## 2020-10-16 PROCEDURE — 99395 PREV VISIT EST AGE 18-39: CPT

## 2020-10-18 LAB
C TRACH RRNA SPEC QL NAA+PROBE: NOT DETECTED
CANDIDA VAG CYTO: NOT DETECTED
G VAGINALIS+PREV SP MTYP VAG QL MICRO: DETECTED
HBV SURFACE AG SER QL: NONREACTIVE
HCG SERPL-MCNC: <1 MIU/ML
HCV AB SER QL: NONREACTIVE
HCV S/CO RATIO: 0.16 S/CO
HIV1+2 AB SPEC QL IA.RAPID: NONREACTIVE
HPV HIGH+LOW RISK DNA PNL CVX: NOT DETECTED
N GONORRHOEA RRNA SPEC QL NAA+PROBE: NOT DETECTED
SOURCE AMPLIFICATION: NORMAL
T PALLIDUM AB SER QL IA: NEGATIVE
T VAGINALIS VAG QL WET PREP: NOT DETECTED

## 2020-10-20 LAB — CYTOLOGY CVX/VAG DOC THIN PREP: ABNORMAL

## 2020-10-30 ENCOUNTER — TRANSCRIPTION ENCOUNTER (OUTPATIENT)
Age: 40
End: 2020-10-30

## 2020-11-03 ENCOUNTER — TRANSCRIPTION ENCOUNTER (OUTPATIENT)
Age: 40
End: 2020-11-03

## 2020-12-02 NOTE — COUNSELING
[Nutrition/ Exercise/ Weight Management] : nutrition, exercise, weight management [Body Image] : body image [STD (testing, results, tx)] : STD (testing, results, tx)

## 2020-12-02 NOTE — HISTORY OF PRESENT ILLNESS
[FreeTextEntry1] : 39 y/o F,  to female partner, thinking of conceiving. Doing well, some discharge, desires STI testing.

## 2020-12-08 ENCOUNTER — APPOINTMENT (OUTPATIENT)
Dept: DERMATOLOGY | Facility: CLINIC | Age: 40
End: 2020-12-08

## 2020-12-15 ENCOUNTER — APPOINTMENT (OUTPATIENT)
Dept: OBGYN | Facility: CLINIC | Age: 40
End: 2020-12-15
Payer: COMMERCIAL

## 2020-12-15 DIAGNOSIS — Z30.09 ENCOUNTER FOR OTHER GENERAL COUNSELING AND ADVICE ON CONTRACEPTION: ICD-10-CM

## 2020-12-15 PROCEDURE — 99213 OFFICE O/P EST LOW 20 MIN: CPT | Mod: 95

## 2020-12-15 NOTE — HISTORY OF PRESENT ILLNESS
[Home] : at home, [unfilled] , at the time of the visit. [Other Location: e.g. Home (Enter Location, City,State)___] : at [unfilled] [Verbal consent obtained from patient] : the patient, [unfilled] [FreeTextEntry1] : Given the extraordinary circumstances surrounding the COVID-19 pandemic with [place] as an epicenter, both [facility] and [facility] guidelines protecting individuals and the community, I called the patient for a telemedicine consultation in lieu of the scheduled in-person visit. The patient gave verbal consent to this billable encounter and agreed to be financially responsible for any co-payment, co-insurance and/or deductible. She understands that this video visit is offered for her convenience and that she is able to cancel and reschedule for an in-person appointment if desired. She also acknowledged that sensitive medical information may be discussed during this video visit and that it is her responsibility to locate herself in a location that ensures privacy to her level of comfort. All current medical problems, medications and allergies were reviewed. The patient understands the plan and is in agreement. All questions were answered. All additional follow up visits will be scheduled taking into consideration ongoing COVID-19 precautions when appropriate. This was a telehealth service rendered by [employer] ([employer])\par \par Patient recently  from female partner and with male partner, previously friends of 19 years. Reports that she is happy in this relationship and thinking of starting a family next year after October. Would like to discuss contraceptive options. Regular menses, lasting 3-5 days with moderate to light flow. Previously took OCP and did not like how it made her feel. Seeing therapist currently and overcoming additive behaviors (Food) and purging.

## 2020-12-15 NOTE — DISCUSSION/SUMMARY
[FreeTextEntry1] : Discussed contraceptive options, risks and benefits with patient. Interested in IUD (paragard and Mirena) Will contemplate options and decide by next week.

## 2020-12-15 NOTE — COUNSELING
[Contraception/ Emergency Contraception/ Safe Sexual Practices] : contraception, emergency contraception, safe sexual practices [Preconception Care/ Fertility options] : preconception care, fertility options

## 2020-12-21 ENCOUNTER — TRANSCRIPTION ENCOUNTER (OUTPATIENT)
Age: 40
End: 2020-12-21

## 2021-02-17 ENCOUNTER — APPOINTMENT (OUTPATIENT)
Dept: OBGYN | Facility: CLINIC | Age: 41
End: 2021-02-17
Payer: COMMERCIAL

## 2021-02-17 ENCOUNTER — NON-APPOINTMENT (OUTPATIENT)
Age: 41
End: 2021-02-17

## 2021-03-15 ENCOUNTER — TRANSCRIPTION ENCOUNTER (OUTPATIENT)
Age: 41
End: 2021-03-15

## 2021-03-16 ENCOUNTER — TRANSCRIPTION ENCOUNTER (OUTPATIENT)
Age: 41
End: 2021-03-16

## 2021-03-17 ENCOUNTER — APPOINTMENT (OUTPATIENT)
Dept: OBGYN | Facility: CLINIC | Age: 41
End: 2021-03-17
Payer: COMMERCIAL

## 2021-03-17 ENCOUNTER — ASOB RESULT (OUTPATIENT)
Age: 41
End: 2021-03-17

## 2021-03-17 VITALS
SYSTOLIC BLOOD PRESSURE: 119 MMHG | HEIGHT: 66 IN | HEART RATE: 66 BPM | TEMPERATURE: 97.4 F | DIASTOLIC BLOOD PRESSURE: 81 MMHG

## 2021-03-17 LAB
HCG UR QL: NEGATIVE
QUALITY CONTROL: YES

## 2021-03-17 PROCEDURE — 76830 TRANSVAGINAL US NON-OB: CPT

## 2021-03-17 PROCEDURE — 58300 INSERT INTRAUTERINE DEVICE: CPT

## 2021-03-17 PROCEDURE — 99072 ADDL SUPL MATRL&STAF TM PHE: CPT

## 2021-03-17 NOTE — PROCEDURE
[IUD Placement] : intrauterine device (IUD) placement [Time out performed] : Pre-procedure time out performed.  Patient's name, date of birth and procedure confirmed. [Consent Obtained] : Consent obtained [Prevention of Pregnancy] : prevention of pregnancy [Risks] : risks [Benefits] : benefits [Alternatives] : alternatives [Patient] : patient [Infection] : infection [Bleeding] : bleeding [Pain] : pain [Expulsion] : expulsion [Failure] : failure [Uterine Perforation] : uterine perforation [Neg Pregnancy Test] : negative pregnancy test [Betadine] : Betadine [Tenaculum] : Tenaculum [Required Dilation] : required dilation [Sounded to ___ cm] : sounded to [unfilled] ~Ucm [Post Placement Transvag. US] : post placement transvaginal ultrasound [ParaGard IUD] : ParaGard IUD [Tolerated Well] : Patient tolerated the procedure well [No Complications] : No complications [None] : None

## 2021-03-19 LAB
C TRACH RRNA SPEC QL NAA+PROBE: NOT DETECTED
N GONORRHOEA RRNA SPEC QL NAA+PROBE: NOT DETECTED
SOURCE AMPLIFICATION: NORMAL

## 2021-03-25 ENCOUNTER — TRANSCRIPTION ENCOUNTER (OUTPATIENT)
Age: 41
End: 2021-03-25

## 2021-03-31 ENCOUNTER — NON-APPOINTMENT (OUTPATIENT)
Age: 41
End: 2021-03-31

## 2021-04-02 ENCOUNTER — TRANSCRIPTION ENCOUNTER (OUTPATIENT)
Age: 41
End: 2021-04-02

## 2021-04-27 ENCOUNTER — TRANSCRIPTION ENCOUNTER (OUTPATIENT)
Age: 41
End: 2021-04-27

## 2021-06-15 ENCOUNTER — TRANSCRIPTION ENCOUNTER (OUTPATIENT)
Age: 41
End: 2021-06-15

## 2021-06-18 ENCOUNTER — TRANSCRIPTION ENCOUNTER (OUTPATIENT)
Age: 41
End: 2021-06-18

## 2021-06-24 ENCOUNTER — APPOINTMENT (OUTPATIENT)
Dept: OBGYN | Facility: CLINIC | Age: 41
End: 2021-06-24

## 2021-08-17 ENCOUNTER — APPOINTMENT (OUTPATIENT)
Dept: OBGYN | Facility: CLINIC | Age: 41
End: 2021-08-17
Payer: COMMERCIAL

## 2021-08-17 VITALS
WEIGHT: 186 LBS | BODY MASS INDEX: 29.89 KG/M2 | SYSTOLIC BLOOD PRESSURE: 111 MMHG | HEART RATE: 63 BPM | HEIGHT: 66 IN | DIASTOLIC BLOOD PRESSURE: 69 MMHG

## 2021-08-17 PROCEDURE — 58301 REMOVE INTRAUTERINE DEVICE: CPT

## 2021-08-17 RX ORDER — PANTOPRAZOLE 40 MG/1
40 TABLET, DELAYED RELEASE ORAL
Qty: 30 | Refills: 0 | Status: ACTIVE | COMMUNITY
Start: 2021-07-29

## 2021-08-17 RX ORDER — BUPROPION HYDROCHLORIDE 150 MG/1
150 TABLET, EXTENDED RELEASE ORAL
Qty: 90 | Refills: 0 | Status: ACTIVE | COMMUNITY
Start: 2021-03-31

## 2021-08-17 RX ORDER — DEXLANSOPRAZOLE 60 MG/1
60 CAPSULE, DELAYED RELEASE ORAL
Qty: 90 | Refills: 0 | Status: ACTIVE | COMMUNITY
Start: 2021-08-13

## 2021-08-17 RX ORDER — BUPROPION HYDROCHLORIDE 300 MG/1
300 TABLET, EXTENDED RELEASE ORAL
Qty: 90 | Refills: 0 | Status: ACTIVE | COMMUNITY
Start: 2021-06-07

## 2021-08-17 RX ORDER — FLUOXETINE HYDROCHLORIDE 40 MG/1
40 CAPSULE ORAL
Qty: 90 | Refills: 0 | Status: ACTIVE | COMMUNITY
Start: 2021-08-09

## 2021-08-17 RX ORDER — METFORMIN HYDROCHLORIDE 500 MG/1
500 TABLET, COATED ORAL
Refills: 0 | Status: DISCONTINUED | COMMUNITY
End: 2021-08-17

## 2021-08-17 RX ORDER — METRONIDAZOLE 7.5 MG/G
0.75 GEL VAGINAL
Qty: 1 | Refills: 1 | Status: DISCONTINUED | COMMUNITY
Start: 2020-10-18 | End: 2021-08-17

## 2021-08-17 RX ORDER — HYDROXYZINE HYDROCHLORIDE 50 MG/1
50 TABLET ORAL
Qty: 270 | Refills: 0 | Status: ACTIVE | COMMUNITY
Start: 2021-06-06

## 2021-08-17 RX ORDER — FLUCONAZOLE 150 MG/1
150 TABLET ORAL
Qty: 1 | Refills: 0 | Status: DISCONTINUED | COMMUNITY
Start: 2021-06-21

## 2021-08-17 RX ORDER — ARIPIPRAZOLE 2 MG/1
2 TABLET ORAL
Qty: 30 | Refills: 0 | Status: ACTIVE | COMMUNITY
Start: 2021-07-22

## 2021-08-17 RX ORDER — LISDEXAMFETAMINE DIMESYLATE 30 MG/1
30 CAPSULE ORAL
Qty: 30 | Refills: 0 | Status: ACTIVE | COMMUNITY
Start: 2021-06-23

## 2021-08-17 NOTE — PROCEDURE
[IUD Removal] : intrauterine device (IUD) removal [Fertility Desired] : fertility desired [Benefits] : benefits [Patient] : patient [Speculum Placed] : speculum placed [IUD Removed - Forceps] : IUD removed - forceps [IUD Discarded] : IUD discarded [Tolerated Well] : Patient tolerated the procedure well [No Complications] : no complications

## 2021-09-23 ENCOUNTER — APPOINTMENT (OUTPATIENT)
Dept: OBGYN | Facility: CLINIC | Age: 41
End: 2021-09-23
Payer: COMMERCIAL

## 2021-09-23 VITALS
BODY MASS INDEX: 30.7 KG/M2 | SYSTOLIC BLOOD PRESSURE: 124 MMHG | HEIGHT: 66 IN | WEIGHT: 191 LBS | DIASTOLIC BLOOD PRESSURE: 74 MMHG | HEART RATE: 73 BPM

## 2021-09-23 DIAGNOSIS — N76.0 ACUTE VAGINITIS: ICD-10-CM

## 2021-09-23 PROCEDURE — 99213 OFFICE O/P EST LOW 20 MIN: CPT

## 2021-09-23 RX ORDER — METRONIDAZOLE 500 MG/1
500 TABLET ORAL TWICE DAILY
Qty: 14 | Refills: 0 | Status: ACTIVE | COMMUNITY
Start: 2021-09-23 | End: 1900-01-01

## 2021-09-23 NOTE — PHYSICAL EXAM
[Appropriately responsive] : appropriately responsive [Alert] : alert [No Acute Distress] : no acute distress [Regular Rate Rhythm] : regular rate rhythm [Soft] : soft [Non-tender] : non-tender [Non-distended] : non-distended [Oriented x3] : oriented x3 [FreeTextEntry5] : Non labored respirations [Labia Majora] : normal [Labia Minora] : normal [Normal] : normal [Uterine Adnexae] : normal [FreeTextEntry4] : No discharge, no bleeding

## 2021-09-23 NOTE — HISTORY OF PRESENT ILLNESS
[FreeTextEntry1] : 39 y/o P0 LMP 9/6/21 presents with fishy vaginal odor with intercourse. She had copper IUD removed in August, trying to conceive. She is currently sexually active with one male partner. Otherwise denies abnormal vaginal bleeding, pelvic pain, nausea or vomiting. Denies fever or chills. Reports BV in past, treated with metrogel.

## 2021-09-23 NOTE — PLAN
[FreeTextEntry1] : 41 y/o P0 LMP 9/6/21 with vaginal irritation and odor with intercourse, possible bacterial vaginosis\par \par Plan:\par - GC/CT today\par - requests STI testing\par - BV panel\par - will treat empirically with flagyl 500mg BID x7 days, advised to avoid EtOH during treatment to avoid disulfiram reaction\par - will follow up results and treat accordingly

## 2021-09-24 LAB
HBV SURFACE AG SER QL: NONREACTIVE
HCV AB SER QL: NONREACTIVE
HCV S/CO RATIO: 0.26 S/CO
HIV1+2 AB SPEC QL IA.RAPID: NONREACTIVE
T PALLIDUM AB SER QL IA: NEGATIVE

## 2021-09-27 LAB
CANDIDA VAG CYTO: NOT DETECTED
G VAGINALIS+PREV SP MTYP VAG QL MICRO: DETECTED
T VAGINALIS VAG QL WET PREP: NOT DETECTED

## 2021-09-28 LAB
C TRACH RRNA SPEC QL NAA+PROBE: NOT DETECTED
N GONORRHOEA RRNA SPEC QL NAA+PROBE: NOT DETECTED
SOURCE TP AMPLIFICATION: NORMAL

## 2021-10-12 ENCOUNTER — APPOINTMENT (OUTPATIENT)
Dept: OBGYN | Facility: CLINIC | Age: 41
End: 2021-10-12

## 2021-10-17 NOTE — PHYSICAL EXAM
[Appropriately responsive] : appropriately responsive [Alert] : alert [No Acute Distress] : no acute distress [No Lymphadenopathy] : no lymphadenopathy [Non-tender] : non-tender [Soft] : soft [Non-distended] : non-distended [No HSM] : No HSM [No Lesions] : no lesions [No Mass] : no mass [Oriented x3] : oriented x3 [Examination Of The Breasts] : a normal appearance [No Masses] : no breast masses were palpable [Labia Majora] : normal [Labia Minora] : normal [Normal] : normal [Uterine Adnexae] : normal

## 2021-10-22 ENCOUNTER — APPOINTMENT (OUTPATIENT)
Dept: OBGYN | Facility: CLINIC | Age: 41
End: 2021-10-22

## 2021-10-22 DIAGNOSIS — Z01.419 ENCOUNTER FOR GYNECOLOGICAL EXAMINATION (GENERAL) (ROUTINE) W/OUT ABNORMAL FINDINGS: ICD-10-CM

## 2021-10-26 ENCOUNTER — APPOINTMENT (OUTPATIENT)
Dept: OBGYN | Facility: CLINIC | Age: 41
End: 2021-10-26

## 2021-10-28 ENCOUNTER — APPOINTMENT (OUTPATIENT)
Dept: OBGYN | Facility: CLINIC | Age: 41
End: 2021-10-28
Payer: COMMERCIAL

## 2021-10-28 VITALS
DIASTOLIC BLOOD PRESSURE: 61 MMHG | HEART RATE: 50 BPM | SYSTOLIC BLOOD PRESSURE: 97 MMHG | HEIGHT: 66 IN | WEIGHT: 191 LBS | BODY MASS INDEX: 30.7 KG/M2

## 2021-10-28 DIAGNOSIS — Z31.69 ENCOUNTER FOR OTHER GENERAL COUNSELING AND ADVICE ON PROCREATION: ICD-10-CM

## 2021-10-28 PROCEDURE — 99214 OFFICE O/P EST MOD 30 MIN: CPT

## 2021-10-29 LAB
MEV IGG FLD QL IA: 151 AU/ML
MEV IGG+IGM SER-IMP: POSITIVE
MUV AB SER-ACNC: POSITIVE
MUV IGG SER QL IA: 82.8 AU/ML
RUBV IGG FLD-ACNC: 8.7 INDEX
RUBV IGG SER-IMP: POSITIVE
VZV AB TITR SER: POSITIVE
VZV IGG SER IF-ACNC: 441.3 INDEX

## 2021-12-01 ENCOUNTER — APPOINTMENT (OUTPATIENT)
Dept: OBGYN | Facility: CLINIC | Age: 41
End: 2021-12-01

## 2021-12-01 NOTE — DISCHARGE NOTE PROVIDER - NSDCHOSPICE_GEN_A_CORE
"Last Written Prescription Date:  12/8/20  Last Fill Quantity: 90,  # refills: 3   Last office visit provider:  10/5/21     Requested Prescriptions   Pending Prescriptions Disp Refills     metoprolol succinate ER (TOPROL-XL) 50 MG 24 hr tablet [Pharmacy Med Name: METOPROLOL ER SUCCINATE 50MG TABS] 90 tablet 3     Sig: TAKE 1 TABLET(50 MG) BY MOUTH DAILY       Beta-Blockers Protocol Passed - 11/30/2021  5:16 AM        Passed - Blood pressure under 140/90 in past 12 months     BP Readings from Last 3 Encounters:   10/05/21 120/82   04/05/21 120/80   09/18/20 138/86                 Passed - Patient is age 6 or older        Passed - Recent (12 mo) or future (30 days) visit within the authorizing provider's specialty     Patient has had an office visit with the authorizing provider or a provider within the authorizing providers department within the previous 12 mos or has a future within next 30 days. See \"Patient Info\" tab in inbasket, or \"Choose Columns\" in Meds & Orders section of the refill encounter.              Passed - Medication is active on med list             Danyel Narvaez RN 12/01/21 1:28 PM  " No

## 2022-05-20 ENCOUNTER — NON-APPOINTMENT (OUTPATIENT)
Age: 42
End: 2022-05-20

## 2022-11-09 ENCOUNTER — NON-APPOINTMENT (OUTPATIENT)
Age: 42
End: 2022-11-09

## 2022-12-18 ENCOUNTER — NON-APPOINTMENT (OUTPATIENT)
Age: 42
End: 2022-12-18

## 2023-01-23 NOTE — H&P PST ADULT - NSANTHBMIRD_ENT_A_CORE
Last refill:7/22/2022    Last OV:11/23/2022    Upcoming appointment:2/20/2023    Last Labs:7/22/2022    Routed to pcp per protocol    Mail order request  
No

## 2023-05-16 NOTE — PROGRESS NOTE ADULT - SUBJECTIVE AND OBJECTIVE BOX
POSTPARTUM PROGRESS NOTE    Subjective:     PPD#: 1   Procedure:    EGA: 39w2d   N/V: Yes   F/C: Yes   Abd Pain: Mild, well-controlled with oral pain medication   Lochia: Mild   Voiding: Yes   Ambulating: Yes   Bowel fnc: Yes   Breastfeeding: No   Contraception: Immediate post-placental Liletta IUD placed   Circumcision: Consented.  Needs to be examined by OB attending.     Objective:      Temp:  [97.7 °F (36.5 °C)-97.9 °F (36.6 °C)] 97.8 °F (36.6 °C)  Pulse:  [60-80] 60  Resp:  [17-18] 18  SpO2:  [99 %-100 %] 99 %  BP: (110-129)/(58-80) 121/80    Lung: Normal respiratory effort   Abdomen: Soft, appropriately tender   Uterus: Firm, no fundal tenderness   Incision: N/A   : Deferred   Extremities: Bilateral trace edema     Lab Review    No results for input(s): NA, K, CL, CO2, BUN, CREATININE, GLU, PROT, BILITOT, ALKPHOS, ALT, AST, MG, PHOS in the last 168 hours.    Recent Labs   Lab 05/15/23  0552 23  0534   WBC 7.75 7.90  7.90   HGB 10.9* 9.3*  9.3*   HCT 32.0* 28.0*  28.0*   MCV 90 91  91    165  165         I/O    Intake/Output Summary (Last 24 hours) at 2023 0659  Last data filed at 5/15/2023 1418  Gross per 24 hour   Intake --   Output 100 ml   Net -100 ml        Assessment and Plan:   Postpartum care:  - Patient doing well.  - Continue routine management and advances.    Rh negative  - Will need postpartum rhogam work up  - Baby: AB pos  - Rhogam w/u pp    Di Mata MD PGY-1  Obstetrics and Gynecology  [facility]           Post Op Day#: 2  Procedure:  Laparoscopic Sleeve Gastrectomy    The patient is doing well without complaints.    Vital Signs Last 24 Hrs  T(C): 36.9 (14 Aug 2019 05:04), Max: 37 (13 Aug 2019 11:32)  T(F): 98.4 (14 Aug 2019 05:04), Max: 98.6 (13 Aug 2019 11:32)  HR: 56 (14 Aug 2019 05:04) (56 - 65)  BP: 114/79 (14 Aug 2019 05:04) (104/72 - 121/69)  BP(mean): --  RR: 17 (14 Aug 2019 05:04) (17 - 17)  SpO2: 100% (14 Aug 2019 05:04) (100% - 100%)    PHYSICAL EXAM:  General: NAD.  HEENT: no JVD, no jaundice.  LUNGS: CTAB.  Heart: S1 S2 RRR  Abd: soft nt/nd   Wounds: clean dry and intact                          11.3   11.00 )-----------( 254      ( 13 Aug 2019 08:13 )             33.2       08-13    137  |  105  |  5<L>  ----------------------------<  83  3.9   |  25  |  0.74    Ca    9.0      13 Aug 2019 08:13

## 2023-09-26 ENCOUNTER — OUTPATIENT (OUTPATIENT)
Dept: OUTPATIENT SERVICES | Facility: HOSPITAL | Age: 43
LOS: 1 days | Discharge: STILL ADMITTED OR ADMINISTRATIVE | End: 2023-09-26

## 2023-09-26 ENCOUNTER — INPATIENT (INPATIENT)
Facility: HOSPITAL | Age: 43
LOS: 9 days | Discharge: HOME | End: 2023-10-06
Attending: PSYCHIATRY & NEUROLOGY | Admitting: PSYCHIATRY & NEUROLOGY
Payer: COMMERCIAL

## 2023-09-26 VITALS — SYSTOLIC BLOOD PRESSURE: 148 MMHG | DIASTOLIC BLOOD PRESSURE: 94 MMHG | TEMPERATURE: 98 F | HEIGHT: 60.6 IN

## 2023-09-26 DIAGNOSIS — Z98.84 BARIATRIC SURGERY STATUS: Chronic | ICD-10-CM

## 2023-09-26 DIAGNOSIS — Z98.890 OTHER SPECIFIED POSTPROCEDURAL STATES: Chronic | ICD-10-CM

## 2023-09-26 DIAGNOSIS — F33.9 MAJOR DEPRESSIVE DISORDER, RECURRENT, UNSPECIFIED: ICD-10-CM

## 2023-09-26 LAB — SARS-COV-2 RNA SPEC QL NAA+PROBE: SIGNIFICANT CHANGE UP

## 2023-09-26 PROCEDURE — 99221 1ST HOSP IP/OBS SF/LOW 40: CPT

## 2023-09-26 RX ORDER — PANTOPRAZOLE SODIUM 20 MG/1
40 TABLET, DELAYED RELEASE ORAL
Refills: 0 | Status: DISCONTINUED | OUTPATIENT
Start: 2023-09-26 | End: 2023-10-06

## 2023-09-26 RX ORDER — ACETAMINOPHEN 500 MG
650 TABLET ORAL EVERY 6 HOURS
Refills: 0 | Status: DISCONTINUED | OUTPATIENT
Start: 2023-09-26 | End: 2023-10-06

## 2023-09-26 RX ORDER — QUETIAPINE FUMARATE 200 MG/1
50 TABLET, FILM COATED ORAL AT BEDTIME
Refills: 0 | Status: DISCONTINUED | OUTPATIENT
Start: 2023-09-26 | End: 2023-09-27

## 2023-09-26 RX ORDER — BUPROPION HYDROCHLORIDE 150 MG/1
300 TABLET, EXTENDED RELEASE ORAL DAILY
Refills: 0 | Status: DISCONTINUED | OUTPATIENT
Start: 2023-09-26 | End: 2023-09-27

## 2023-09-26 RX ADMIN — QUETIAPINE FUMARATE 50 MILLIGRAM(S): 200 TABLET, FILM COATED ORAL at 20:54

## 2023-09-26 RX ADMIN — Medication 650 MILLIGRAM(S): at 21:33

## 2023-09-26 RX ADMIN — Medication 10 MILLIGRAM(S): at 20:54

## 2023-09-26 NOTE — CHART NOTE - NSCHARTNOTEFT_GEN_A_CORE
Screening Medical Evaluation    Patient Admitted from: [facility] admitting diagnosis: Recurrent major depressive disorder      PAST MEDICAL & SURGICAL HISTORY:  GERD (gastroesophageal reflux disease)  Anxiety and depression  S/P Arthroscopic Knee Surgery - right knee ACL and MCL reconstruction 1998 & 1999  Hyperhydrosis disorder s/p bilat. sympathectomy 2010  S/P laparoscopic sleeve gastrectomy  History of esophagogastroduodenoscopy (EGD) 5/2019    ALLERGIES:  No Known Allergies    SOCIAL HISTORY:  Patient denies use of tobacco/nicotine, alcohol, or other illicit substances    FAMILY HISTORY:  No pertinent family history in first degree relatives      MEDICATIONS  (STANDING):  buPROPion XL (24-Hour) 300 milliGRAM(s) Oral daily  busPIRone 10 milliGRAM(s) Oral three times a day  pantoprazole    Tablet 40 milliGRAM(s) Oral before breakfast  QUEtiapine 50 milliGRAM(s) Oral at bedtime    MEDICATIONS  (PRN):  acetaminophen     Tablet .. 650 milliGRAM(s) Oral every 6 hours PRN Moderate Pain (4 - 6)  LORazepam     Tablet 1 milliGRAM(s) Oral every 6 hours PRN anxiety  LORazepam   Injectable 2 milliGRAM(s) IntraMuscular every 8 hours PRN Agitation      Vital Signs Last 24 Hrs  T(F): 97.8 (26 Sep 2023 17:42)  HR: 64 (26 Sep 2023 17:42)   BP: 148/94 (26 Sep 2023 17:42)   BP(mean): --  RR: --  SpO2: --      PHYSICAL EXAM:  GENERAL: NAD  HEAD:  Atraumatic, Normocephalic  EYES: Conjunctiva and sclera clear  NECK: Supple, No JVD  CHEST/LUNG: Clear to auscultation bilaterally; No wheeze, rales, rhonchi  HEART: Regular rate and rhythm; No murmurs, rubs, or gallops  ABDOMEN: Soft, Nontender, Nondistended; Bowel sounds present  EXTREMITIES:  2+ Peripheral Pulses, No clubbing, cyanosis, or edema  NEUROLOGY: non-focal  SKIN: No rashes or lesions      Assessment and Plan:  42F admitted to [facility] for Recurrent major depressive disorder w/ PMHx of GERD s/p laparoscopic sleeve gastrectomy seen at bedside for medical screening evaluation. Patient has no acute complaints at this time. Patient denies fever, chills, headache, dizziness, lightheadedness, N/V, SOB, cough, congestion, chest pain, abdominal pain, dysuria, hematuria, diarrhea, constipation. Physical exam unremarkable, VSS. Admission labs pending. F/u EKG in AM.    1.) Recurrent major depressive disorder: Refer to primary team documentation for recs  2.) GERD: Continue pantoprazole 40mg before breakfast QD

## 2023-09-26 NOTE — BH INPATIENT PSYCHIATRY ASSESSMENT NOTE - NSBHCHARTREVIEWVS_PSY_A_CORE FT
Vital Signs Last 24 Hrs  T(C): 36.6 (09-26-23 @ 17:42), Max: 36.6 (09-26-23 @ 17:42)  T(F): 97.8 (09-26-23 @ 17:42), Max: 97.8 (09-26-23 @ 17:42)  HR: --  BP: 148/94 (09-26-23 @ 17:42) (148/94 - 148/94)  BP(mean): --  RR: --  SpO2: --    Orthostatic VS  09-26-23 @ 17:42  Lying BP: --/-- HR: --  Sitting BP: --/-- HR: --  Standing BP: --/-- HR: 64  Site: --  Mode: --

## 2023-09-26 NOTE — BH INPATIENT PSYCHIATRY ASSESSMENT NOTE - NSBHASSESSSUMMFT_PSY_ALL_CORE
42 year-old with hx of GERD, depression, with recently worsening depression despite treatment with antidepressant and antipsychotic augmentation, good   medication adherence, recently with some suicidal ideations without intent, presented to [facility] requesting inpatient admission for medication tx and safety  Currently no suicidal ideation, is voluntary will routine checks  Continue meds, defer Rexulti  ?hx DM (in previous record, patient denies any medical hx other than GERD)- HgA1C for AM, labs in AM

## 2023-09-26 NOTE — BH INPATIENT PSYCHIATRY ASSESSMENT NOTE - CURRENT MEDICATION
MEDICATIONS  (STANDING):  buPROPion XL (24-Hour) 300 milliGRAM(s) Oral daily  busPIRone 10 milliGRAM(s) Oral three times a day  pantoprazole    Tablet 40 milliGRAM(s) Oral before breakfast  QUEtiapine 50 milliGRAM(s) Oral at bedtime    MEDICATIONS  (PRN):  LORazepam     Tablet 1 milliGRAM(s) Oral every 6 hours PRN anxiety  LORazepam   Injectable 2 milliGRAM(s) IntraMuscular every 8 hours PRN Agitation

## 2023-09-26 NOTE — BH INPATIENT PSYCHIATRY ASSESSMENT NOTE - MSE UNSTRUCTURED FT
Patient is awake and alert. Affect is sullen, slightly anxious. Calm. Cooperative. No motor agitation or retardation. Speech is fluent. Thought process is logical and coherent. No delusions. Oriented in all spheres. Good insight. Denies current suicidal ideations, feels safe on the unit.

## 2023-09-26 NOTE — BH PATIENT PROFILE - FALL HARM RISK - UNIVERSAL INTERVENTIONS
Bed in lowest position, wheels locked, appropriate side rails in place/Call bell, personal items and telephone in reach/Instruct patient to call for assistance before getting out of bed or chair/Non-slip footwear when patient is out of bed/[place] to call system/Physically safe environment - no spills, clutter or unnecessary equipment/Purposeful Proactive Rounding/Room/bathroom lighting operational, light cord in reach

## 2023-09-26 NOTE — BH INPATIENT PSYCHIATRY ASSESSMENT NOTE - VIOLENCE RISK FACTORS:
Problem: SAFETY  Goal: Free from accidental physical injury  Reactivated  Goal: Free from intentional harm  Reactivated None Known

## 2023-09-26 NOTE — BH INPATIENT PSYCHIATRY ASSESSMENT NOTE - HPI (INCLUDE ILLNESS QUALITY, SEVERITY, DURATION, TIMING, CONTEXT, MODIFYING FACTORS, ASSOCIATED SIGNS AND SYMPTOMS)
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 Manzano x2 years most recent appointment 9/21, tx with bupropion, buspirone, quetiapine, Rexulti, engaged in individual therapy with Omayra Kolb x2 years, hx of suicide attempt in 7th grade by ingesting nyquil, making her mother aware who assisted pt in obtaining care with local ED (stomach pumped), no hx of NSSIB, denies alcohol use over the last 15 years, previously 3-4 drinks/day, denies hx of withdrawals, denies seizures and reports incidence of 1 black out prior to treatment. [facility] with  depression and suicidal ideations with thoughts of CO poisoning without intent.  requesting voluntary hospitalization.

## 2023-09-27 DIAGNOSIS — Z86.59 PERSONAL HISTORY OF OTHER MENTAL AND BEHAVIORAL DISORDERS: ICD-10-CM

## 2023-09-27 LAB
A1C WITH ESTIMATED AVERAGE GLUCOSE RESULT: 5.2 % — SIGNIFICANT CHANGE UP (ref 4–5.6)
ALBUMIN SERPL ELPH-MCNC: 3.9 G/DL — SIGNIFICANT CHANGE UP (ref 3.3–5)
ALP SERPL-CCNC: 62 U/L — SIGNIFICANT CHANGE UP (ref 40–120)
ALT FLD-CCNC: 13 U/L — SIGNIFICANT CHANGE UP (ref 4–33)
ANION GAP SERPL CALC-SCNC: 14 MMOL/L — SIGNIFICANT CHANGE UP (ref 7–14)
AST SERPL-CCNC: 14 U/L — SIGNIFICANT CHANGE UP (ref 4–32)
BASOPHILS # BLD AUTO: 0.03 K/UL — SIGNIFICANT CHANGE UP (ref 0–0.2)
BASOPHILS NFR BLD AUTO: 0.5 % — SIGNIFICANT CHANGE UP (ref 0–2)
BILIRUB SERPL-MCNC: 0.3 MG/DL — SIGNIFICANT CHANGE UP (ref 0.2–1.2)
BUN SERPL-MCNC: 10 MG/DL — SIGNIFICANT CHANGE UP (ref 7–23)
CALCIUM SERPL-MCNC: 9.1 MG/DL — SIGNIFICANT CHANGE UP (ref 8.4–10.5)
CHLORIDE SERPL-SCNC: 98 MMOL/L — SIGNIFICANT CHANGE UP (ref 98–107)
CHOLEST SERPL-MCNC: 137 MG/DL — SIGNIFICANT CHANGE UP
CO2 SERPL-SCNC: 25 MMOL/L — SIGNIFICANT CHANGE UP (ref 22–31)
CREAT SERPL-MCNC: 0.99 MG/DL — SIGNIFICANT CHANGE UP (ref 0.5–1.3)
EGFR: 73 ML/MIN/1.73M2 — SIGNIFICANT CHANGE UP
EOSINOPHIL # BLD AUTO: 0.05 K/UL — SIGNIFICANT CHANGE UP (ref 0–0.5)
EOSINOPHIL NFR BLD AUTO: 0.8 % — SIGNIFICANT CHANGE UP (ref 0–6)
ESTIMATED AVERAGE GLUCOSE: 103 — SIGNIFICANT CHANGE UP
GLUCOSE SERPL-MCNC: 138 MG/DL — HIGH (ref 70–99)
HCG SERPL-ACNC: 2.3 MIU/ML — SIGNIFICANT CHANGE UP
HCT VFR BLD CALC: 37.4 % — SIGNIFICANT CHANGE UP (ref 34.5–45)
HDLC SERPL-MCNC: 59 MG/DL — SIGNIFICANT CHANGE UP
HGB BLD-MCNC: 12.4 G/DL — SIGNIFICANT CHANGE UP (ref 11.5–15.5)
IANC: 4.21 K/UL — SIGNIFICANT CHANGE UP (ref 1.8–7.4)
IMM GRANULOCYTES NFR BLD AUTO: 0.3 % — SIGNIFICANT CHANGE UP (ref 0–0.9)
LIPID PNL WITH DIRECT LDL SERPL: 62 MG/DL — SIGNIFICANT CHANGE UP
LYMPHOCYTES # BLD AUTO: 1.4 K/UL — SIGNIFICANT CHANGE UP (ref 1–3.3)
LYMPHOCYTES # BLD AUTO: 22.4 % — SIGNIFICANT CHANGE UP (ref 13–44)
MCHC RBC-ENTMCNC: 30.5 PG — SIGNIFICANT CHANGE UP (ref 27–34)
MCHC RBC-ENTMCNC: 33.2 GM/DL — SIGNIFICANT CHANGE UP (ref 32–36)
MCV RBC AUTO: 91.9 FL — SIGNIFICANT CHANGE UP (ref 80–100)
MONOCYTES # BLD AUTO: 0.55 K/UL — SIGNIFICANT CHANGE UP (ref 0–0.9)
MONOCYTES NFR BLD AUTO: 8.8 % — SIGNIFICANT CHANGE UP (ref 2–14)
NEUTROPHILS # BLD AUTO: 4.21 K/UL — SIGNIFICANT CHANGE UP (ref 1.8–7.4)
NEUTROPHILS NFR BLD AUTO: 67.2 % — SIGNIFICANT CHANGE UP (ref 43–77)
NON HDL CHOLESTEROL: 78 MG/DL — SIGNIFICANT CHANGE UP
NRBC # BLD: 0 /100 WBCS — SIGNIFICANT CHANGE UP (ref 0–0)
NRBC # FLD: 0 K/UL — SIGNIFICANT CHANGE UP (ref 0–0)
PLATELET # BLD AUTO: 272 K/UL — SIGNIFICANT CHANGE UP (ref 150–400)
POTASSIUM SERPL-MCNC: 4.2 MMOL/L — SIGNIFICANT CHANGE UP (ref 3.5–5.3)
POTASSIUM SERPL-SCNC: 4.2 MMOL/L — SIGNIFICANT CHANGE UP (ref 3.5–5.3)
PROT SERPL-MCNC: 6.1 G/DL — SIGNIFICANT CHANGE UP (ref 6–8.3)
RBC # BLD: 4.07 M/UL — SIGNIFICANT CHANGE UP (ref 3.8–5.2)
RBC # FLD: 13 % — SIGNIFICANT CHANGE UP (ref 10.3–14.5)
SODIUM SERPL-SCNC: 137 MMOL/L — SIGNIFICANT CHANGE UP (ref 135–145)
TRIGL SERPL-MCNC: 80 MG/DL — SIGNIFICANT CHANGE UP
TSH SERPL-MCNC: 1.03 UIU/ML — SIGNIFICANT CHANGE UP (ref 0.27–4.2)
WBC # BLD: 6.26 K/UL — SIGNIFICANT CHANGE UP (ref 3.8–10.5)
WBC # FLD AUTO: 6.26 K/UL — SIGNIFICANT CHANGE UP (ref 3.8–10.5)

## 2023-09-27 PROCEDURE — 90834 PSYTX W PT 45 MINUTES: CPT | Mod: 59

## 2023-09-27 PROCEDURE — 90853 GROUP PSYCHOTHERAPY: CPT

## 2023-09-27 PROCEDURE — 99233 SBSQ HOSP IP/OBS HIGH 50: CPT

## 2023-09-27 RX ORDER — TRAZODONE HCL 50 MG
50 TABLET ORAL AT BEDTIME
Refills: 0 | Status: DISCONTINUED | OUTPATIENT
Start: 2023-09-27 | End: 2023-10-03

## 2023-09-27 RX ORDER — ESCITALOPRAM OXALATE 10 MG/1
10 TABLET, FILM COATED ORAL DAILY
Refills: 0 | Status: DISCONTINUED | OUTPATIENT
Start: 2023-09-28 | End: 2023-10-02

## 2023-09-27 RX ORDER — ESCITALOPRAM OXALATE 10 MG/1
5 TABLET, FILM COATED ORAL ONCE
Refills: 0 | Status: COMPLETED | OUTPATIENT
Start: 2023-09-27 | End: 2023-09-27

## 2023-09-27 RX ORDER — CLONAZEPAM 1 MG
0.5 TABLET ORAL
Refills: 0 | Status: DISCONTINUED | OUTPATIENT
Start: 2023-09-27 | End: 2023-09-29

## 2023-09-27 RX ORDER — LANOLIN ALCOHOL/MO/W.PET/CERES
3 CREAM (GRAM) TOPICAL AT BEDTIME
Refills: 0 | Status: DISCONTINUED | OUTPATIENT
Start: 2023-09-27 | End: 2023-10-03

## 2023-09-27 RX ADMIN — Medication 10 MILLIGRAM(S): at 08:52

## 2023-09-27 RX ADMIN — Medication 10 MILLIGRAM(S): at 12:51

## 2023-09-27 RX ADMIN — Medication 0.5 MILLIGRAM(S): at 20:34

## 2023-09-27 RX ADMIN — ESCITALOPRAM OXALATE 5 MILLIGRAM(S): 10 TABLET, FILM COATED ORAL at 15:15

## 2023-09-27 RX ADMIN — BUPROPION HYDROCHLORIDE 300 MILLIGRAM(S): 150 TABLET, EXTENDED RELEASE ORAL at 08:52

## 2023-09-27 RX ADMIN — Medication 1 MILLIGRAM(S): at 08:54

## 2023-09-27 RX ADMIN — Medication 3 MILLIGRAM(S): at 02:21

## 2023-09-27 RX ADMIN — PANTOPRAZOLE SODIUM 40 MILLIGRAM(S): 20 TABLET, DELAYED RELEASE ORAL at 08:52

## 2023-09-27 NOTE — BH SOCIAL WORK INITIAL PSYCHOSOCIAL EVALUATION - NSHIGHRISKBEHFT_PSY_ALL_CORE
presented with depression and suicidal ideations with thoughts of CO poisoning without intent.  presented with depression and suicidal ideations with thoughts of CO poisoning without intent. Prior to admission PT reported googling ways to commit suicide, and was contemplating going in her garage running her car and putting Rag in her gas pipe.

## 2023-09-27 NOTE — BH SOCIAL WORK INITIAL PSYCHOSOCIAL EVALUATION - OTHER PAST PSYCHIATRIC HISTORY (INCLUDE DETAILS REGARDING ONSET, COURSE OF ILLNESS, INPATIENT/OUTPATIENT TREATMENT)
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 Manzano x2 years most recent appointment 9/21, tx with bupropion, buspirone, quetiapine, Rexulti, engaged in individual therapy with Omayra Kolb x2 years, hx of suicide attempt in 7th grade by ingesting nyquil, making her mother aware who assisted pt in obtaining care with local ED (stomach pumped), no hx of NSSIB, denies alcohol use over the last 15 years, previously 3-4 drinks/day, denies hx of withdrawals, denies seizures and reports incidence of 1 black out prior to treatment.  requesting voluntary hospitalization.presented to [facility] with  depression and suicidal ideations with thoughts of CO poisoning without intent.    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 Manzano x2 yr most recent appointment 9/21, [place] for therapy with Omayra Kolb x2 yrs, HX of suicide attempt in 7th grade by ingesting nyquil, making her mother aware who assisted pt in obtaining care with local ED (stomach pumped), no HX of NSSIB, denies alcohol use over the last 15 years, previously 3-4 drinks/day, denies HX of withdrawals, denies seizures and reports incidence of 1 black out prior to treatment.SW introduce herself to patient is discussed her role in PT's hospitalization.PT signed HIPPA consents to speak with her psychiatrist hot therapist and her father,SW to maintain contact.

## 2023-09-27 NOTE — BH SOCIAL WORK INITIAL PSYCHOSOCIAL EVALUATION - NSBHTREATHXREASONFT_PSY_ALL_CORE
[facility] 15 years ago followed by outpatient substance abuse tx for 6 months with [facility],

## 2023-09-27 NOTE — BH SOCIAL WORK INITIAL PSYCHOSOCIAL EVALUATION - NSBHTREATHXTYPE_PSY_ALL_CORE
Pt threatened [facility] because he did not get the dessert that he wanted. Pt was asked not to threaten staff or peers. Pt was asked if he was anxious and said yes, second time pt threatened violence today so he was given B52 oral.    inpatient substance abuse rehab

## 2023-09-27 NOTE — BH INPATIENT PSYCHIATRY PROGRESS NOTE - CURRENT MEDICATION
MEDICATIONS  (STANDING):  clonazePAM  Tablet 0.5 milliGRAM(s) Oral two times a day  escitalopram 5 milliGRAM(s) Oral once  pantoprazole    Tablet 40 milliGRAM(s) Oral before breakfast    MEDICATIONS  (PRN):  acetaminophen     Tablet .. 650 milliGRAM(s) Oral every 6 hours PRN Moderate Pain (4 - 6)  LORazepam     Tablet 1 milliGRAM(s) Oral every 6 hours PRN anxiety  LORazepam   Injectable 2 milliGRAM(s) IntraMuscular every 8 hours PRN Agitation  melatonin. 3 milliGRAM(s) Oral at bedtime PRN Insomnia  traZODone 50 milliGRAM(s) Oral at bedtime PRN insomnia

## 2023-09-27 NOTE — PSYCHIATRIC REHAB INITIAL EVALUATION - NSBHEMPSATISFY_PSY_ALL_CORE
Patient did report that she is in the process of looking for a new job as she is unsatisfied with her current position/No (specify)

## 2023-09-27 NOTE — BH INPATIENT PSYCHIATRY PROGRESS NOTE - NSBHASSESSSUMMFT_PSY_ALL_CORE
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 Manzano x2 years most recent appointment , tx with bupropion, buspirone, quetiapine, Rexulti engaged in individual therapy with Omayra Kolb x2 years, hx of suicide attempt in 7th grade by ingesting nyquil, making her mother aware who assisted pt in obtaining care with local ED (stomach pumped), no hx of NSSIB, denies problematic alcohol use over the last 15 years, previously 3-4 drinks/day, denies hx of withdrawals, denies seizures. presented to [facility] with  depression and suicidal ideation with thoughts of CO poisoning and some preparatory acts before aborting attempt and seeking care after her father called to check in while she was prepping.  requesting voluntary hospitalization.    On admit patient presents with prominent anxious sx with associated depressed mood, hopelessness, poor concentration and passive SI (more active prior to admit), has only really been tx with Wellbutrin long term but no hx of SSRI trials though hx of nataliia is unconvincing (e day of sx). With elements suggestive of BPD personality organization though without SIB, chronic SI, considerable hx of impulsivity, reactivity.    Plan:   -admit to low 6 on , to benefit from inpatient hospitalization for acute stabilization and safety in context of aborted SA.   -Appropriate for routine checks, not expected to pose danger to self or others in controlled inpatient setting  -Psychiatric:   DC wellbutrin XL 300mg daily, may be exacerbating anxiety, plan to start Lexapro 5mg today then increase to 10mg   Start clonazepam 0.5mg BID for now, may need higher HS dose to establish regular sleep schedule.   PRNS: Ativan 1mg for anxiety, 2mg for agitation  -Trazodone 50mg HS for insomnia  I/G/M therapy  -Medical: no acute issues, will order EKG if not in chart.   -Dispo/collateral:  pending stabilization/spoke with father to obtain hx, awaiting callback from Dr. Manzano left . SW to f/u with outpatient therapist.

## 2023-09-27 NOTE — PSYCHIATRIC REHAB INITIAL EVALUATION - NSBHPRRECOMMEND_PSY_ALL_CORE
Writer met with patient to orient to unit and introduce self, psychiatric rehabilitation staff and department functions. In response to the COVID-19 outbreak, there has been a shift in hospital wide policies and protocols. As a result, it should be noted that unit programming will be re-evaluated on a consistent basis in effort to maintain safety guidelines. Writer encouraged patient to attend psychiatric rehabilitation groups and engage in treatment. Patient was receptive to meeting with writer and engaged in admission interview. Patient presented as depressed and was forthcoming with personal data and information surrounding admitting circumstances. Patient was admitted to [facility] due to worsening depression and SI. Patient identified several stressors including taking care of her mother, financial stress, and dissatisfaction with her job. Writer and patient were able to establish a collaborative psychiatric rehabilitation goal. Psychiatric Rehabilitation staff will continue to engage patient daily in order to develop therapeutic rapport.

## 2023-09-28 PROCEDURE — 90853 GROUP PSYCHOTHERAPY: CPT

## 2023-09-28 PROCEDURE — 99232 SBSQ HOSP IP/OBS MODERATE 35: CPT

## 2023-09-28 PROCEDURE — 90832 PSYTX W PT 30 MINUTES: CPT | Mod: 59

## 2023-09-28 RX ADMIN — PANTOPRAZOLE SODIUM 40 MILLIGRAM(S): 20 TABLET, DELAYED RELEASE ORAL at 08:53

## 2023-09-28 RX ADMIN — Medication 3 MILLIGRAM(S): at 03:29

## 2023-09-28 RX ADMIN — Medication 0.5 MILLIGRAM(S): at 20:35

## 2023-09-28 RX ADMIN — Medication 650 MILLIGRAM(S): at 08:56

## 2023-09-28 RX ADMIN — Medication 650 MILLIGRAM(S): at 20:40

## 2023-09-28 RX ADMIN — Medication 0.5 MILLIGRAM(S): at 08:53

## 2023-09-28 RX ADMIN — Medication 650 MILLIGRAM(S): at 09:35

## 2023-09-28 RX ADMIN — ESCITALOPRAM OXALATE 10 MILLIGRAM(S): 10 TABLET, FILM COATED ORAL at 08:53

## 2023-09-28 NOTE — BH INPATIENT PSYCHIATRY PROGRESS NOTE - NSBHASSESSSUMMFT_PSY_ALL_CORE
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 Manzano x2 years most recent appointment , tx with bupropion, buspirone, quetiapine, Rexulti engaged in individual therapy with Omayra Kolb x2 years, hx of suicide attempt in 7th grade by ingesting nyquil, making her mother aware who assisted pt in obtaining care with local ED (stomach pumped), no hx of NSSIB, denies problematic alcohol use over the last 15 years, previously 3-4 drinks/day, denies hx of withdrawals, denies seizures. presented to [facility] with  depression and suicidal ideation with thoughts of CO poisoning and some preparatory acts before aborting attempt and seeking care after her father called to check in while she was prepping.  requesting voluntary hospitalization.    On admit patient presents with prominent anxious sx with associated depressed mood, hopelessness, poor concentration and passive SI (more active prior to admit), has only really been tx with Wellbutrin long term but no hx of SSRI trials though hx of nataliia is unconvincing (e day of sx). With elements suggestive of BPD personality organization though without SIB, chronic SI, considerable hx of impulsivity, reactivity. Presentation seems more acute than would be explained by chronic BPD thus believe pt to have MDE + anxiety. tolerating Lexapro trial thus far. Working to establish appropriate Klonopin standing dose regimen as bridge to SSRI efficacy.     Plan:   -admit to low 6 on , to benefit from inpatient hospitalization for acute stabilization and safety in context of aborted SA.   -Appropriate for routine checks, not expected to pose danger to self or others in controlled inpatient setting  -Psychiatric:   DC'd wellbutrin XL 300mg and Buspar on   -started lexapro 10mg   c/w clonazepam 0.5mg BID for now, may need higher HS dose to establish regular sleep schedule.   PRNS: Ativan 1mg for anxiety, 2mg for agitation  -Trazodone 50mg HS for insomnia  I/G/M therapy  -Medical: no acute issues, f/u EKG when done.    -Dispo/collateral:  pending stabilization/spoke with father to obtain hx, awaiting callback from Dr. Manzano left . SW to f/u with outpatient therapist.

## 2023-09-28 NOTE — BH INPATIENT PSYCHIATRY PROGRESS NOTE - CURRENT MEDICATION
MEDICATIONS  (STANDING):  clonazePAM  Tablet 0.5 milliGRAM(s) Oral two times a day  escitalopram 10 milliGRAM(s) Oral daily  pantoprazole    Tablet 40 milliGRAM(s) Oral before breakfast    MEDICATIONS  (PRN):  acetaminophen     Tablet .. 650 milliGRAM(s) Oral every 6 hours PRN Moderate Pain (4 - 6)  LORazepam     Tablet 1 milliGRAM(s) Oral every 6 hours PRN anxiety  LORazepam   Injectable 2 milliGRAM(s) IntraMuscular every 8 hours PRN Agitation  melatonin. 3 milliGRAM(s) Oral at bedtime PRN Insomnia  traZODone 50 milliGRAM(s) Oral at bedtime PRN insomnia

## 2023-09-29 PROCEDURE — 90834 PSYTX W PT 45 MINUTES: CPT | Mod: 59

## 2023-09-29 PROCEDURE — 90853 GROUP PSYCHOTHERAPY: CPT

## 2023-09-29 PROCEDURE — 99232 SBSQ HOSP IP/OBS MODERATE 35: CPT

## 2023-09-29 RX ORDER — CLONAZEPAM 1 MG
0.5 TABLET ORAL EVERY 12 HOURS
Refills: 0 | Status: DISCONTINUED | OUTPATIENT
Start: 2023-09-29 | End: 2023-10-02

## 2023-09-29 RX ORDER — CLONAZEPAM 1 MG
0.5 TABLET ORAL AT BEDTIME
Refills: 0 | Status: DISCONTINUED | OUTPATIENT
Start: 2023-09-29 | End: 2023-10-02

## 2023-09-29 RX ORDER — HYDROXYZINE HCL 10 MG
25 TABLET ORAL EVERY 6 HOURS
Refills: 0 | Status: DISCONTINUED | OUTPATIENT
Start: 2023-09-29 | End: 2023-10-06

## 2023-09-29 RX ADMIN — ESCITALOPRAM OXALATE 10 MILLIGRAM(S): 10 TABLET, FILM COATED ORAL at 08:50

## 2023-09-29 RX ADMIN — Medication 0.5 MILLIGRAM(S): at 20:05

## 2023-09-29 RX ADMIN — Medication 0.5 MILLIGRAM(S): at 08:50

## 2023-09-29 RX ADMIN — PANTOPRAZOLE SODIUM 40 MILLIGRAM(S): 20 TABLET, DELAYED RELEASE ORAL at 07:55

## 2023-09-29 NOTE — BH INPATIENT PSYCHIATRY PROGRESS NOTE - CURRENT MEDICATION
MEDICATIONS  (STANDING):  clonazePAM  Tablet 0.5 milliGRAM(s) Oral at bedtime  escitalopram 10 milliGRAM(s) Oral daily  pantoprazole    Tablet 40 milliGRAM(s) Oral before breakfast    MEDICATIONS  (PRN):  acetaminophen     Tablet .. 650 milliGRAM(s) Oral every 6 hours PRN Moderate Pain (4 - 6)  clonazePAM  Tablet 0.5 milliGRAM(s) Oral every 12 hours PRN severe anxiety  hydrOXYzine hydrochloride 25 milliGRAM(s) Oral every 6 hours PRN anxiety  LORazepam   Injectable 2 milliGRAM(s) IntraMuscular every 8 hours PRN Agitation  melatonin. 3 milliGRAM(s) Oral at bedtime PRN Insomnia  traZODone 50 milliGRAM(s) Oral at bedtime PRN insomnia

## 2023-09-29 NOTE — BH INPATIENT PSYCHIATRY PROGRESS NOTE - NSBHADMITMEDEDUDETAILS_PSY_A_CORE FT
Discussed risk benefit of change of med to SSRI patient in agreement with plan 

## 2023-09-29 NOTE — BH INPATIENT PSYCHIATRY PROGRESS NOTE - NSBHASSESSSUMMFT_PSY_ALL_CORE
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 Manzano x2 years most recent appointment 9/21, tx with bupropion, buspirone, quetiapine, Rexulti engaged in individual therapy with Omayra Kolb x2 years, hx of suicide attempt in 7th grade by ingesting nyquil, making her mother aware who assisted pt in obtaining care with local ED (stomach pumped), no hx of NSSIB, denies problematic alcohol use over the last 15 years, previously 3-4 drinks/day, denies hx of withdrawals, denies seizures. presented to [facility] with  depression and suicidal ideation with thoughts of CO poisoning and some preparatory acts before aborting attempt and seeking care after her father called to check in while she was prepping.  requesting voluntary hospitalization.    On admit patient presents with prominent anxious sx with associated depressed mood, hopelessness, poor concentration and passive SI (more active prior to admit), has only really been tx with Wellbutrin + viloxazine (new Nor-epi reuptake inhibitor) and Seroquel, rexulti, but no hx of SSRI trials, seems likely that combination of wellbutrin + viloxazine may have been exacerbating anxiety and were without apparent efficacy for depressive sx, thus both discontinued, patient advised to try monotherapy with SSRI for 4-6 weeks before considering add on tx for concentration. Given substance hx plan to taper Klonopin prior to dc, will switch to 0.5mg HS for tonight (remove AM dose). Patient good PHP candidate after dc.     Plan:   -admit to low 6 on 9.13, to benefit from inpatient hospitalization for acute stabilization and safety in context of aborted SA.   -Appropriate for routine checks, not expected to pose danger to self or others in controlled inpatient setting  -Psychiatric:   DC'd wellbutrin XL 300mg and Buspar on 9/27  -dc viloxazine (likely worsening anxiety)  -started lexapro 10mg 9/28 can consider further titration after weekend.   will change from clonazepam 0.5mg BID to 0.5mg HS with 0.5mg PRN for more severe anxiety and Atarax 25mg PRN for mild-mod anxiety.  -plan to taper clonazepam prior to discharge, favor atarax for as needed.   PRNS: dc ativan anxiety PRN to simplify regimen.   -Trazodone 50mg HS for insomnia PRN  I/G/M therapy  -Medical: no acute issues    -Dispo: Patient is great candidate for PHP program after dc, at this time patient open, SW to f/u referrals.   collateral:  pending stabilization/spoke with father to obtain hx, awaiting callback from Dr. Manzano left . SW to f/u with outpatient therapist.

## 2023-09-30 PROCEDURE — 99232 SBSQ HOSP IP/OBS MODERATE 35: CPT

## 2023-09-30 RX ADMIN — ESCITALOPRAM OXALATE 10 MILLIGRAM(S): 10 TABLET, FILM COATED ORAL at 09:05

## 2023-09-30 RX ADMIN — Medication 25 MILLIGRAM(S): at 09:05

## 2023-09-30 RX ADMIN — PANTOPRAZOLE SODIUM 40 MILLIGRAM(S): 20 TABLET, DELAYED RELEASE ORAL at 09:05

## 2023-09-30 RX ADMIN — Medication 0.5 MILLIGRAM(S): at 20:21

## 2023-09-30 NOTE — BH INPATIENT PSYCHIATRY PROGRESS NOTE - NSBHASSESSSUMMFT_PSY_ALL_CORE
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 Manzano x2 years most recent appointment 9/21, tx with bupropion, buspirone, quetiapine, Rexulti engaged in individual therapy with Omayra Kolb x2 years, hx of suicide attempt in 7th grade by ingesting nyquil, making her mother aware who assisted pt in obtaining care with local ED (stomach pumped), no hx of NSSIB, denies problematic alcohol use over the last 15 years, previously 3-4 drinks/day, denies hx of withdrawals, denies seizures. presented to [facility] with  depression and suicidal ideation with thoughts of CO poisoning and some preparatory acts before aborting attempt and seeking care after her father called to check in while she was prepping.  requesting voluntary hospitalization.    On admit patient presents with prominent anxious sx with associated depressed mood, hopelessness, poor concentration and passive SI (more active prior to admit), has only really been tx with Wellbutrin + viloxazine (new Nor-epi reuptake inhibitor) and Seroquel, rexulti, but no hx of SSRI trials, seems likely that combination of wellbutrin + viloxazine may have been exacerbating anxiety and were without apparent efficacy for depressive sx, thus both discontinued, patient advised to try monotherapy with SSRI for 4-6 weeks before considering add on tx for concentration. Given substance hx plan to taper Klonopin prior to dc, will switch to 0.5mg HS for tonight (remove AM dose). Patient good PHP candidate after dc.     9/30: remains depressed, goal directed, denied SI/I/P  Plan:   -admit to [facility] on 9.13, to benefit from inpatient hospitalization for acute stabilization and safety in context of aborted SA.   -Appropriate for routine checks, not expected to pose danger to self or others in controlled inpatient setting  -Psychiatric:   DC'd wellbutrin XL 300mg and Buspar on 9/27  -dc viloxazine (likely worsening anxiety)  -started lexapro 10mg 9/28 can consider further titration after weekend.   will change from clonazepam 0.5mg BID to 0.5mg HS with 0.5mg PRN for more severe anxiety and Atarax 25mg PRN for mild-mod anxiety.  -plan to taper clonazepam prior to discharge, favor atarax for as needed.   PRNS: dc ativan anxiety PRN to simplify regimen.   -Trazodone 50mg HS for insomnia PRN  I/G/M therapy  -Medical: no acute issues    -Dispo: Patient is great candidate for PHP program after dc, at this time patient open, SW to f/u referrals.   collateral:  pending stabilization/spoke with father to obtain hx, awaiting callback from Dr. Jose Juan yuan . SW to f/u with outpatient therapist.

## 2023-09-30 NOTE — BH INPATIENT PSYCHIATRY PROGRESS NOTE - MSE UNSTRUCTURED FT
Appearance: Patient appears stated age, fair-grooming, good hygiene, dressed casually.   Behavior: Cooperative during interview, appropriate eye contact.   Speech: Normal rate, productivity. monotone  Motor: No PMA/R.  Gait: normal, ambulating without issue  Mood: "down"   Affect: mildly anxious but reactive, mood congruent.   Thought Process: Linear, goal directed  Thought Content: patient denies SI/ HI and denies passive SI today, denies urges to self-injure.   Perceptual: Denies sensory disturbances.   Cognitive: aaox3, attention/concentration intact. Memory intact   Insight: good  Judgement: good  Impulse control: intact on unit.
Appearance: Patient appears stated age, fair-grooming, good hygiene, dressed casually.   Behavior: Cooperative during interview, appropriate eye contact.   Speech: Normal rate, productivity and tone.   Motor: No PMA/R.  Gait: normal, ambulating without issue  Mood: "a little anxious but better"   Affect: mildly anxious but reactive, mood congruent.   Thought Process: Linear, goal directed some ruminative anxiety re bills and admin tasks + work stress.   Thought Content: patient denies SI/ HI and denies passive SI today, denies urges to self-injure.   Perceptual: Denies sensory disturbances.   Cognitive: aaox3, attention/concentration intact. Memory intact   Insight: good  Judgement: good  Impulse control: intact on unit.
Appearance: Patient appears stated age, fair-grooming, good hygiene, dressed casually.   Behavior: Cooperative during interview, appropriate eye contact.   Speech: Normal rate, productivity and tone.   Motor: No PMA/R.  Gait: normal, ambulating without issue  Mood: "anxious" tearful at times   Affect: sad/anxious constricted, mood congruent.   Thought Process: Linear, goal directed some ruminative anxiety re bills and admin tasks + work stress.   Thought Content: patient denies SI/ HI and denies passive SI today, denies urges to self-injure.   Perceptual: Denies sensory disturbances.   Cognitive: aaox3, attention/concentration intact. Memory intact   Insight: good  Judgement: fair  Impulse control: intact on unit.
Appearance: Patient appears stated age, fair-grooming, fair hygiene, dressed casually.   Behavior: Cooperative during interview, appropriate eye contact.   Speech: Normal rate, productivity and tone.   Motor: No PMA/R.  Gait: normal, ambulating without issue  Mood: "depressed/anxious"   Affect: sad/anxious constricted, mood congruent.   Thought Process: Linear, goal directed  Thought Content: patient denies SI/ HI (endorses thinking easier if dead. ie: passive SI), denies urges to self-injure.   Perceptual: Denies sensory disturbances.   Cognitive: aaox3, attention/concentration intact. Memory intact   Insight: good  Judgement: fair  Impulse control: intact on unit.

## 2023-10-01 RX ADMIN — Medication 25 MILLIGRAM(S): at 09:03

## 2023-10-01 RX ADMIN — Medication 0.5 MILLIGRAM(S): at 20:18

## 2023-10-01 RX ADMIN — PANTOPRAZOLE SODIUM 40 MILLIGRAM(S): 20 TABLET, DELAYED RELEASE ORAL at 09:02

## 2023-10-01 RX ADMIN — ESCITALOPRAM OXALATE 10 MILLIGRAM(S): 10 TABLET, FILM COATED ORAL at 09:02

## 2023-10-02 PROCEDURE — 99232 SBSQ HOSP IP/OBS MODERATE 35: CPT

## 2023-10-02 RX ORDER — POLYETHYLENE GLYCOL 3350 17 G/17G
17 POWDER, FOR SOLUTION ORAL DAILY
Refills: 0 | Status: DISCONTINUED | OUTPATIENT
Start: 2023-10-02 | End: 2023-10-04

## 2023-10-02 RX ORDER — ESCITALOPRAM OXALATE 10 MG/1
20 TABLET, FILM COATED ORAL DAILY
Refills: 0 | Status: DISCONTINUED | OUTPATIENT
Start: 2023-10-03 | End: 2023-10-06

## 2023-10-02 RX ADMIN — Medication 50 MILLIGRAM(S): at 20:18

## 2023-10-02 RX ADMIN — Medication 3 MILLIGRAM(S): at 20:17

## 2023-10-02 RX ADMIN — ESCITALOPRAM OXALATE 10 MILLIGRAM(S): 10 TABLET, FILM COATED ORAL at 08:17

## 2023-10-02 RX ADMIN — PANTOPRAZOLE SODIUM 40 MILLIGRAM(S): 20 TABLET, DELAYED RELEASE ORAL at 08:17

## 2023-10-02 RX ADMIN — Medication 25 MILLIGRAM(S): at 08:18

## 2023-10-02 NOTE — DIETITIAN INITIAL EVALUATION ADULT - PERTINENT MEDS FT
MEDICATIONS  (STANDING):  escitalopram 10 milliGRAM(s) Oral daily  pantoprazole    Tablet 40 milliGRAM(s) Oral before breakfast    MEDICATIONS  (PRN):  acetaminophen     Tablet .. 650 milliGRAM(s) Oral every 6 hours PRN Moderate Pain (4 - 6)  clonazePAM  Tablet 0.5 milliGRAM(s) Oral every 12 hours PRN severe anxiety  hydrOXYzine hydrochloride 25 milliGRAM(s) Oral every 6 hours PRN anxiety  LORazepam   Injectable 2 milliGRAM(s) IntraMuscular every 8 hours PRN Agitation  melatonin. 3 milliGRAM(s) Oral at bedtime PRN Insomnia  traZODone 50 milliGRAM(s) Oral at bedtime PRN insomnia   MEDICATIONS  (STANDING):  escitalopram 10 milliGRAM(s) Oral daily  pantoprazole    Tablet 40 milliGRAM(s) Oral before breakfast

## 2023-10-02 NOTE — DIETITIAN INITIAL EVALUATION ADULT - OTHER INFO
Pt is a 43 y/o female with PPHx of Major depressive disorder, Eating Disorder -AN and Bulimia ( inpatient treatment on 2020) , GERD, Hx of Partial gastrectomy in 2019, remote inpatient substance abuse treatment (15 years ago) Pt presents to [facility] crisis center with Anxiety and depression and SI. Admitted to [facility] for MDD. saw Pt in her room. reports good appetite/po intake at present. Complains of constipation. No bowel movement for 4 days.    Pt is a 43 y/o female with PPHx of Major depressive disorder, Eating Disorder -AN and Bulimia (inpatient treatment on 2020) , GERD, Hx of Sleeve gastrectomy, remote inpatient substance abuse treatment (15 years ago) Pt presents to [facility] crisis center with Anxiety and depression and SI. Admitted to [facility] for MDD. Saw Pt in her room. reports good appetite/po intake at present. Complains of constipation. No bowel movement for 4 days. Encouraged fluid and fiber intake. Pt verbalized understanding. NKFA. UBW: 190 lbs Pt is a 43 y/o female with PPHx of Major depressive disorder, Eating Disorder-AN and Bulimia (inpatient treatment on 2020), GERD, Hx of Sleeve gastrectomy, remote inpatient substance abuse treatment (15 years ago) Pt presents to [facility] crisis center with Anxiety and depression and SI. Admitted to [facility] for MDD. Saw Pt in her room. Reports good appetite/po intake at present. Complains of constipation. No bowel movement for 4 days. Encouraged fluid and fiber intake. Pt verbalized understanding. NKFA. UBW: 190 lbs

## 2023-10-02 NOTE — BH INPATIENT PSYCHIATRY PROGRESS NOTE - CURRENT MEDICATION
MEDICATIONS  (STANDING):  pantoprazole    Tablet 40 milliGRAM(s) Oral before breakfast  polyethylene glycol 3350 17 Gram(s) Oral daily    MEDICATIONS  (PRN):  acetaminophen     Tablet .. 650 milliGRAM(s) Oral every 6 hours PRN Moderate Pain (4 - 6)  hydrOXYzine hydrochloride 25 milliGRAM(s) Oral every 6 hours PRN anxiety  LORazepam   Injectable 2 milliGRAM(s) IntraMuscular every 8 hours PRN Agitation  melatonin. 3 milliGRAM(s) Oral at bedtime PRN Insomnia  traZODone 50 milliGRAM(s) Oral at bedtime PRN insomnia

## 2023-10-02 NOTE — BH INPATIENT PSYCHIATRY PROGRESS NOTE - NSBHASSESSSUMMFT_PSY_ALL_CORE
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 Manzano x2 years most recent appointment 9/21, tx with bupropion, buspirone, quetiapine, Rexulti engaged in individual therapy with Omayra Kolb x2 years, hx of suicide attempt in 7th grade by ingesting nyquil, making her mother aware who assisted pt in obtaining care with local ED (stomach pumped), no hx of NSSIB, denies problematic alcohol use over the last 15 years, previously 3-4 drinks/day, denies hx of withdrawals, denies seizures. presented to [facility] with  depression and suicidal ideation with thoughts of CO poisoning and some preparatory acts before aborting attempt and seeking care after her father called to check in while she was prepping.  requesting voluntary hospitalization.    On admit patient presents with prominent anxious sx with associated depressed mood, hopelessness, poor concentration and passive SI (more active prior to admit), has only really been tx with Wellbutrin + viloxazine (new Nor-epi reuptake inhibitor) and Seroquel, rexulti, but no hx of SSRI trials, seems likely that combination of wellbutrin + viloxazine may have been exacerbating anxiety and were without apparent efficacy for depressive sx, thus both discontinued, patient advised to try monotherapy with SSRI for 4-6 weeks before considering add on tx for concentration. Given substance hx plan to taper Klonopin prior to dc, will switch to 0.5mg HS for tonight (remove AM dose). Patient good PHP candidate after dc.     Depression and anxiety are improving and she denies SI/I/P  Plan:   -admit to [facility] 6 on 9.13, to benefit from inpatient hospitalization for acute stabilization and safety in context of aborted SA.   -Appropriate for routine checks, not expected to pose danger to self or others in controlled inpatient setting  -Psychiatric:   DC'd wellbutrin XL 300mg and Buspar on 9/27  -dc viloxazine (likely worsening anxiety)  -started lexapro and titrate to 20mg PO daily on 10/3  -Klonopin discontinued   PRNS: dc ativan anxiety PRN to simplify regimen.   -Trazodone 50mg HS for insomnia PRN  I/G/M therapy  -Medical: no acute issues    -Dispo: Patient is great candidate for PHP program after dc, at this time patient open, SW to f/u referrals.   collateral:  pending stabilization/spoke with father to obtain hx, awaiting callback from Dr. Manzano left . SW to f/u with outpatient therapist.

## 2023-10-03 PROCEDURE — 99232 SBSQ HOSP IP/OBS MODERATE 35: CPT

## 2023-10-03 PROCEDURE — 90853 GROUP PSYCHOTHERAPY: CPT

## 2023-10-03 PROCEDURE — 90834 PSYTX W PT 45 MINUTES: CPT | Mod: 59

## 2023-10-03 RX ORDER — SENNA PLUS 8.6 MG/1
2 TABLET ORAL AT BEDTIME
Refills: 0 | Status: DISCONTINUED | OUTPATIENT
Start: 2023-10-03 | End: 2023-10-06

## 2023-10-03 RX ORDER — LANOLIN ALCOHOL/MO/W.PET/CERES
5 CREAM (GRAM) TOPICAL AT BEDTIME
Refills: 0 | Status: DISCONTINUED | OUTPATIENT
Start: 2023-10-03 | End: 2023-10-06

## 2023-10-03 RX ADMIN — Medication 5 MILLIGRAM(S): at 20:17

## 2023-10-03 RX ADMIN — PANTOPRAZOLE SODIUM 40 MILLIGRAM(S): 20 TABLET, DELAYED RELEASE ORAL at 08:52

## 2023-10-03 RX ADMIN — POLYETHYLENE GLYCOL 3350 17 GRAM(S): 17 POWDER, FOR SOLUTION ORAL at 08:52

## 2023-10-03 RX ADMIN — SENNA PLUS 2 TABLET(S): 8.6 TABLET ORAL at 20:15

## 2023-10-03 RX ADMIN — Medication 25 MILLIGRAM(S): at 20:33

## 2023-10-03 RX ADMIN — Medication 25 MILLIGRAM(S): at 08:51

## 2023-10-03 RX ADMIN — ESCITALOPRAM OXALATE 20 MILLIGRAM(S): 10 TABLET, FILM COATED ORAL at 08:50

## 2023-10-03 NOTE — BH INPATIENT PSYCHIATRY PROGRESS NOTE - NSBHASSESSSUMMFT_PSY_ALL_CORE
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 Manzano x2 years most recent appointment 9/21, tx with bupropion, buspirone, quetiapine, Rexulti engaged in individual therapy with Omayra Kolb x2 years, hx of suicide attempt in 7th grade by ingesting nyquil, making her mother aware who assisted pt in obtaining care with local ED (stomach pumped), no hx of NSSIB, denies problematic alcohol use over the last 15 years, previously 3-4 drinks/day, denies hx of withdrawals, denies seizures. presented to [facility] with  depression and suicidal ideation with thoughts of CO poisoning and some preparatory acts before aborting attempt and seeking care after her father called to check in while she was prepping.  requesting voluntary hospitalization.    On admit patient presents with prominent anxious sx with associated depressed mood, hopelessness, poor concentration and passive SI (more active prior to admit), has only really been tx with Wellbutrin + viloxazine (new Nor-epi reuptake inhibitor) and Seroquel, rexulti, but no hx of SSRI trials, seems likely that combination of wellbutrin + viloxazine may have been exacerbating anxiety and were without apparent efficacy for depressive sx, thus both discontinued, patient advised to try monotherapy with SSRI for 4-6 weeks before considering add on tx for concentration. Given substance hx plan to taper Klonopin prior to dc, will switch to 0.5mg HS for tonight (remove AM dose). Patient good PHP candidate after dc.     Depression and anxiety are improving and she denies SI/I/P    Plan:   -admit to [facility] 6 on 9.13, to benefit from inpatient hospitalization for acute stabilization and safety in context of aborted SA.   -Appropriate for routine checks, not expected to pose danger to self or others in controlled inpatient setting  -Psychiatric:   DC'd wellbutrin XL 300mg and Buspar on 9/27  -dc viloxazine (likely worsening anxiety)  -started lexapro and titrate to 20mg PO daily on 10/3  -Klonopin discontinued   PRNS: dc ativan anxiety PRN to simplify regimen.   -Melatonin 5mg PO PRN at bedtime for insomnia  I/G/M therapy  -Medical: no acute issues    -Dispo: Patient is great candidate for PHP program or IOP after dc, at this time patient open, SW to f/u referrals.   collateral:  Treatment team spoke to patient's father and outpatient therapist who are on board with referral to PHP or IOP.

## 2023-10-03 NOTE — BH INPATIENT PSYCHIATRY PROGRESS NOTE - CURRENT MEDICATION
MEDICATIONS  (STANDING):  escitalopram 20 milliGRAM(s) Oral daily  pantoprazole    Tablet 40 milliGRAM(s) Oral before breakfast  polyethylene glycol 3350 17 Gram(s) Oral daily    MEDICATIONS  (PRN):  acetaminophen     Tablet .. 650 milliGRAM(s) Oral every 6 hours PRN Moderate Pain (4 - 6)  hydrOXYzine hydrochloride 25 milliGRAM(s) Oral every 6 hours PRN anxiety  LORazepam   Injectable 2 milliGRAM(s) IntraMuscular every 8 hours PRN Agitation  melatonin. 5 milliGRAM(s) Oral at bedtime PRN Insomnia

## 2023-10-03 NOTE — BH SAFETY PLAN - DISTRACTION PLACE 2
[address] [place] [address] / [facility] / [facility]

## 2023-10-04 PROCEDURE — 90834 PSYTX W PT 45 MINUTES: CPT | Mod: 59

## 2023-10-04 PROCEDURE — 99232 SBSQ HOSP IP/OBS MODERATE 35: CPT

## 2023-10-04 PROCEDURE — 90853 GROUP PSYCHOTHERAPY: CPT

## 2023-10-04 RX ORDER — GLYCERIN ADULT
1 SUPPOSITORY, RECTAL RECTAL ONCE
Refills: 0 | Status: COMPLETED | OUTPATIENT
Start: 2023-10-04 | End: 2023-10-04

## 2023-10-04 RX ORDER — POLYETHYLENE GLYCOL 3350 17 G/17G
17 POWDER, FOR SOLUTION ORAL
Refills: 0 | Status: DISCONTINUED | OUTPATIENT
Start: 2023-10-04 | End: 2023-10-06

## 2023-10-04 RX ADMIN — Medication 5 MILLIGRAM(S): at 20:19

## 2023-10-04 RX ADMIN — Medication 25 MILLIGRAM(S): at 08:59

## 2023-10-04 RX ADMIN — Medication 25 MILLIGRAM(S): at 20:27

## 2023-10-04 RX ADMIN — POLYETHYLENE GLYCOL 3350 17 GRAM(S): 17 POWDER, FOR SOLUTION ORAL at 20:19

## 2023-10-04 RX ADMIN — ESCITALOPRAM OXALATE 20 MILLIGRAM(S): 10 TABLET, FILM COATED ORAL at 08:55

## 2023-10-04 RX ADMIN — POLYETHYLENE GLYCOL 3350 17 GRAM(S): 17 POWDER, FOR SOLUTION ORAL at 08:55

## 2023-10-04 RX ADMIN — SENNA PLUS 2 TABLET(S): 8.6 TABLET ORAL at 20:19

## 2023-10-04 RX ADMIN — Medication 5 MILLIGRAM(S): at 20:27

## 2023-10-04 RX ADMIN — PANTOPRAZOLE SODIUM 40 MILLIGRAM(S): 20 TABLET, DELAYED RELEASE ORAL at 08:55

## 2023-10-04 RX ADMIN — Medication 1 SUPPOSITORY(S): at 16:26

## 2023-10-04 NOTE — BH INPATIENT PSYCHIATRY PROGRESS NOTE - NSBHASSESSSUMMFT_PSY_ALL_CORE
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 Manzano x2 years most recent appointment 9/21, tx with bupropion, buspirone, quetiapine, Rexulti engaged in individual therapy with Omayra Kolb x2 years, hx of suicide attempt in 7th grade by ingesting nyquil, making her mother aware who assisted pt in obtaining care with local ED (stomach pumped), no hx of NSSIB, denies problematic alcohol use over the last 15 years, previously 3-4 drinks/day, denies hx of withdrawals, denies seizures. presented to [facility] with  depression and suicidal ideation with thoughts of CO poisoning and some preparatory acts before aborting attempt and seeking care after her father called to check in while she was prepping.  requesting voluntary hospitalization.    On admit patient presents with prominent anxious sx with associated depressed mood, hopelessness, poor concentration and passive SI (more active prior to admit), has only really been tx with Wellbutrin + viloxazine (new Nor-epi reuptake inhibitor) and Seroquel, rexulti, but no hx of SSRI trials, seems likely that combination of wellbutrin + viloxazine may have been exacerbating anxiety and were without apparent efficacy for depressive sx, thus both discontinued, patient advised to try monotherapy with SSRI for 4-6 weeks before considering add on tx for concentration. Patient good PHP candidate after dc.     Depression and anxiety are improving and she denies current SI/I/P    Plan:   -admit to [facility] 6 on 9.13, to benefit from inpatient hospitalization for acute stabilization and safety in context of aborted SA.   -Appropriate for routine checks, not expected to pose danger to self or others in controlled inpatient setting  -Psychiatric:   DC'd wellbutrin XL 300mg and Buspar on 9/27  -dc viloxazine (likely worsening anxiety)  -started lexapro and titrate to 20mg PO daily on 10/3  -Klonopin discontinued   PRNS: dc ativan anxiety PRN to simplify regimen.   -Melatonin 5mg PO PRN at bedtime for insomnia  I/G/M therapy  -Medical: no acute issues    -Dispo: Patient is great candidate for PHP program or IOP after dc, at this time patient open, SW to f/u referrals.   collateral:  Treatment team spoke to patient's father and outpatient therapist who are on board with referral to PHP or IOP.    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 Manzano x2 years most recent appointment 9/21, tx with bupropion, buspirone, quetiapine, Rexulti engaged in individual therapy with Omayra Kolb x2 years, hx of suicide attempt in 7th grade by ingesting nyquil, making her mother aware who assisted pt in obtaining care with local ED (stomach pumped), no hx of NSSIB, denies problematic alcohol use over the last 15 years, previously 3-4 drinks/day, denies hx of withdrawals, denies seizures. presented to [facility] with  depression and suicidal ideation with thoughts of CO poisoning and some preparatory acts before aborting attempt and seeking care after her father called to check in while she was prepping.  requesting voluntary hospitalization.    On admit patient presents with prominent anxious sx with associated depressed mood, hopelessness, poor concentration and passive SI (more active prior to admit), has only really been tx with Wellbutrin + viloxazine (new Nor-epi reuptake inhibitor) and Seroquel, rexulti, but no hx of SSRI trials, seems likely that combination of wellbutrin + viloxazine may have been exacerbating anxiety and were without apparent efficacy for depressive sx, thus both discontinued, patient advised to try monotherapy with SSRI for 4-6 weeks before considering add on tx for concentration. Patient good PHP candidate after dc.     Depression and anxiety are improving and she denies current SI/I/P    Plan:   -admit to [facility] 6 on 9.13, to benefit from inpatient hospitalization for acute stabilization and safety in context of aborted SA.   -Appropriate for routine checks, not expected to pose danger to self or others in controlled inpatient setting  -Psychiatric:   DC'd wellbutrin XL 300mg and Buspar on 9/27  -dc viloxazine (likely worsening anxiety)  -started lexapro and titrate to 20mg PO daily on 10/3  -Klonopin discontinued   PRNS: dc ativan anxiety PRN to simplify regimen.   -Melatonin 5mg PO PRN at bedtime for insomnia  I/G/M therapy  -Medical: 10/4:  pt c/o constipation x 3 days with no relief from Senna or Miralax, reports using glycerin suppository at home.  Spoke to hospitalist, Dr. Arzate, who reported no contraindication to order glycerin suppository and she will review pt's bowel regimen  -Dispo: Patient is great candidate for PHP program or IOP after dc, at this time patient open, SW to f/u referrals.   collateral:  Treatment team spoke to patient's father and outpatient therapist who are on board with referral to PHP or IOP.

## 2023-10-04 NOTE — BH INPATIENT PSYCHIATRY PROGRESS NOTE - CURRENT MEDICATION
MEDICATIONS  (STANDING):  escitalopram 20 milliGRAM(s) Oral daily  pantoprazole    Tablet 40 milliGRAM(s) Oral before breakfast  polyethylene glycol 3350 17 Gram(s) Oral daily  senna 2 Tablet(s) Oral at bedtime    MEDICATIONS  (PRN):  acetaminophen     Tablet .. 650 milliGRAM(s) Oral every 6 hours PRN Moderate Pain (4 - 6)  hydrOXYzine hydrochloride 25 milliGRAM(s) Oral every 6 hours PRN anxiety  melatonin. 5 milliGRAM(s) Oral at bedtime PRN Insomnia   MEDICATIONS  (STANDING):  escitalopram 20 milliGRAM(s) Oral daily  glycerin Suppository - Adult 1 Suppository(s) Rectal once  pantoprazole    Tablet 40 milliGRAM(s) Oral before breakfast  polyethylene glycol 3350 17 Gram(s) Oral daily  senna 2 Tablet(s) Oral at bedtime    MEDICATIONS  (PRN):  acetaminophen     Tablet .. 650 milliGRAM(s) Oral every 6 hours PRN Moderate Pain (4 - 6)  hydrOXYzine hydrochloride 25 milliGRAM(s) Oral every 6 hours PRN anxiety  melatonin. 5 milliGRAM(s) Oral at bedtime PRN Insomnia

## 2023-10-04 NOTE — CHART NOTE - NSCHARTNOTEFT_GEN_A_CORE
Patient with constipation. No objection to glyercin supppository. Called pharmacy. They do not carry SMOG enema. Increased miralax to BID and added ducolax BID for 4 doses

## 2023-10-05 PROCEDURE — 99238 HOSP IP/OBS DSCHRG MGMT 30/<: CPT

## 2023-10-05 PROCEDURE — 90834 PSYTX W PT 45 MINUTES: CPT | Mod: 59

## 2023-10-05 PROCEDURE — 90853 GROUP PSYCHOTHERAPY: CPT

## 2023-10-05 RX ORDER — BREXPIPRAZOLE 0.25 MG/1
1 TABLET ORAL
Refills: 0 | DISCHARGE

## 2023-10-05 RX ORDER — ESCITALOPRAM OXALATE 10 MG/1
1 TABLET, FILM COATED ORAL
Qty: 30 | Refills: 0
Start: 2023-10-05 | End: 2023-11-03

## 2023-10-05 RX ORDER — HYDROXYZINE HCL 10 MG
1 TABLET ORAL
Qty: 0 | Refills: 0 | DISCHARGE
Start: 2023-10-05

## 2023-10-05 RX ORDER — LANOLIN ALCOHOL/MO/W.PET/CERES
1 CREAM (GRAM) TOPICAL
Qty: 0 | Refills: 0 | DISCHARGE

## 2023-10-05 RX ORDER — POLYETHYLENE GLYCOL 3350 17 G/17G
17 POWDER, FOR SOLUTION ORAL
Qty: 0 | Refills: 0 | DISCHARGE
Start: 2023-10-05

## 2023-10-05 RX ORDER — SENNA PLUS 8.6 MG/1
2 TABLET ORAL
Qty: 0 | Refills: 0 | DISCHARGE
Start: 2023-10-05

## 2023-10-05 RX ORDER — BUPROPION HYDROCHLORIDE 150 MG/1
1 TABLET, EXTENDED RELEASE ORAL
Qty: 0 | Refills: 0 | DISCHARGE

## 2023-10-05 RX ORDER — HYDROXYZINE HCL 10 MG
1 TABLET ORAL
Qty: 60 | Refills: 0
Start: 2023-10-05 | End: 2023-11-03

## 2023-10-05 RX ORDER — ESCITALOPRAM OXALATE 10 MG/1
1 TABLET, FILM COATED ORAL
Qty: 0 | Refills: 0 | DISCHARGE
Start: 2023-10-05

## 2023-10-05 RX ORDER — LANOLIN ALCOHOL/MO/W.PET/CERES
1 CREAM (GRAM) TOPICAL
Qty: 30 | Refills: 0
Start: 2023-10-05 | End: 2023-11-03

## 2023-10-05 RX ADMIN — ESCITALOPRAM OXALATE 20 MILLIGRAM(S): 10 TABLET, FILM COATED ORAL at 08:48

## 2023-10-05 RX ADMIN — Medication 25 MILLIGRAM(S): at 20:04

## 2023-10-05 RX ADMIN — Medication 25 MILLIGRAM(S): at 08:48

## 2023-10-05 RX ADMIN — Medication 5 MILLIGRAM(S): at 20:04

## 2023-10-05 RX ADMIN — PANTOPRAZOLE SODIUM 40 MILLIGRAM(S): 20 TABLET, DELAYED RELEASE ORAL at 08:11

## 2023-10-05 NOTE — BH TREATMENT PLAN - NSBHPRIMARYDX_PSY_ALL_CORE
Severe single episode of major depressive disorder with anxiety    
Severe single episode of major depressive disorder with anxiety

## 2023-10-05 NOTE — BH INPATIENT PSYCHIATRY PROGRESS NOTE - NSBHATTESTTYPEVISIT_PSY_A_CORE
Attending Only
GOLD without on-site Attending supervision
On-site Attending supervising GOLD (99XXX codes)

## 2023-10-05 NOTE — BH INPATIENT PSYCHIATRY DISCHARGE NOTE - NSBHASSESSSUMMFT_PSY_ALL_CORE
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 Manzano x2 years most recent appointment 9/21, tx with bupropion, buspirone, quetiapine, Rexulti engaged in individual therapy with Omayra Kolb x2 years, hx of suicide attempt in 7th grade by ingesting nyquil, making her mother aware who assisted pt in obtaining care with local ED (stomach pumped), no hx of NSSIB, denies problematic alcohol use over the last 15 years, previously 3-4 drinks/day, denies hx of withdrawals, denies seizures. presented to [facility] with  depression and suicidal ideation with thoughts of CO poisoning and some preparatory acts before aborting attempt and seeking care after her father called to check in while she was prepping.  requesting voluntary hospitalization.    On admit patient presents with prominent anxious sx with associated depressed mood, hopelessness, poor concentration and passive SI (more active prior to admit), has only really been tx with Wellbutrin + viloxazine (new Nor-epi reuptake inhibitor) and Seroquel, rexulti, but no hx of SSRI trials, seems likely that combination of wellbutrin + viloxazine may have been exacerbating anxiety and were without apparent efficacy for depressive sx, thus both discontinued, patient advised to try monotherapy with SSRI for 4-6 weeks before considering add on tx for concentration. Patient good PHP candidate after dc.     Depression and anxiety are improving and she denies current SI/I/P    Plan:   -admit to [facility] on 9.13, to benefit from inpatient hospitalization for acute stabilization and safety in context of aborted SA.   -Appropriate for routine checks, not expected to pose danger to self or others in controlled inpatient setting  -Psychiatric:   DC'd wellbutrin XL 300mg and Buspar on 9/27  -dc viloxazine (likely worsening anxiety)  -started lexapro and titrate to 20mg PO daily on 10/3  -Klonopin discontinued   PRNS: dc ativan anxiety PRN to simplify regimen.   -Melatonin 5mg PO PRN at bedtime for insomnia  I/G/M therapy  -Medical: 10/4:  pt c/o constipation x 3 days with no relief from Senna or Miralax, reports using glycerin suppository at home.  Spoke to hospitalist, Dr. Arzate, who reported no contraindication to order glycerin suppository and she will review pt's bowel regimen  -Dispo: Pt safe to discharge with outpatient care at an [facility] on her 72 hour letter

## 2023-10-05 NOTE — BH INPATIENT PSYCHIATRY PROGRESS NOTE - PRN MEDS
MEDICATIONS  (PRN):  acetaminophen     Tablet .. 650 milliGRAM(s) Oral every 6 hours PRN Moderate Pain (4 - 6)  hydrOXYzine hydrochloride 25 milliGRAM(s) Oral every 6 hours PRN anxiety  LORazepam   Injectable 2 milliGRAM(s) IntraMuscular every 8 hours PRN Agitation  melatonin. 3 milliGRAM(s) Oral at bedtime PRN Insomnia  traZODone 50 milliGRAM(s) Oral at bedtime PRN insomnia  
MEDICATIONS  (PRN):  acetaminophen     Tablet .. 650 milliGRAM(s) Oral every 6 hours PRN Moderate Pain (4 - 6)  LORazepam     Tablet 1 milliGRAM(s) Oral every 6 hours PRN anxiety  LORazepam   Injectable 2 milliGRAM(s) IntraMuscular every 8 hours PRN Agitation  melatonin. 3 milliGRAM(s) Oral at bedtime PRN Insomnia  traZODone 50 milliGRAM(s) Oral at bedtime PRN insomnia  
MEDICATIONS  (PRN):  acetaminophen     Tablet .. 650 milliGRAM(s) Oral every 6 hours PRN Moderate Pain (4 - 6)  hydrOXYzine hydrochloride 25 milliGRAM(s) Oral every 6 hours PRN anxiety  LORazepam   Injectable 2 milliGRAM(s) IntraMuscular every 8 hours PRN Agitation  melatonin. 5 milliGRAM(s) Oral at bedtime PRN Insomnia  
MEDICATIONS  (PRN):  acetaminophen     Tablet .. 650 milliGRAM(s) Oral every 6 hours PRN Moderate Pain (4 - 6)  hydrOXYzine hydrochloride 25 milliGRAM(s) Oral every 6 hours PRN anxiety  melatonin. 5 milliGRAM(s) Oral at bedtime PRN Insomnia  
MEDICATIONS  (PRN):  acetaminophen     Tablet .. 650 milliGRAM(s) Oral every 6 hours PRN Moderate Pain (4 - 6)  clonazePAM  Tablet 0.5 milliGRAM(s) Oral every 12 hours PRN severe anxiety  hydrOXYzine hydrochloride 25 milliGRAM(s) Oral every 6 hours PRN anxiety  LORazepam   Injectable 2 milliGRAM(s) IntraMuscular every 8 hours PRN Agitation  melatonin. 3 milliGRAM(s) Oral at bedtime PRN Insomnia  traZODone 50 milliGRAM(s) Oral at bedtime PRN insomnia  
MEDICATIONS  (PRN):  acetaminophen     Tablet .. 650 milliGRAM(s) Oral every 6 hours PRN Moderate Pain (4 - 6)  LORazepam     Tablet 1 milliGRAM(s) Oral every 6 hours PRN anxiety  LORazepam   Injectable 2 milliGRAM(s) IntraMuscular every 8 hours PRN Agitation  melatonin. 3 milliGRAM(s) Oral at bedtime PRN Insomnia  traZODone 50 milliGRAM(s) Oral at bedtime PRN insomnia  
MEDICATIONS  (PRN):  acetaminophen     Tablet .. 650 milliGRAM(s) Oral every 6 hours PRN Moderate Pain (4 - 6)  clonazePAM  Tablet 0.5 milliGRAM(s) Oral every 12 hours PRN severe anxiety  hydrOXYzine hydrochloride 25 milliGRAM(s) Oral every 6 hours PRN anxiety  LORazepam   Injectable 2 milliGRAM(s) IntraMuscular every 8 hours PRN Agitation  melatonin. 3 milliGRAM(s) Oral at bedtime PRN Insomnia  traZODone 50 milliGRAM(s) Oral at bedtime PRN insomnia  
MEDICATIONS  (PRN):  acetaminophen     Tablet .. 650 milliGRAM(s) Oral every 6 hours PRN Moderate Pain (4 - 6)  hydrOXYzine hydrochloride 25 milliGRAM(s) Oral every 6 hours PRN anxiety  melatonin. 5 milliGRAM(s) Oral at bedtime PRN Insomnia

## 2023-10-05 NOTE — BH INPATIENT PSYCHIATRY PROGRESS NOTE - CURRENT MEDICATION
MEDICATIONS  (STANDING):  bisacodyl 5 milliGRAM(s) Oral every 12 hours  escitalopram 20 milliGRAM(s) Oral daily  pantoprazole    Tablet 40 milliGRAM(s) Oral before breakfast  polyethylene glycol 3350 17 Gram(s) Oral two times a day  senna 2 Tablet(s) Oral at bedtime    MEDICATIONS  (PRN):  acetaminophen     Tablet .. 650 milliGRAM(s) Oral every 6 hours PRN Moderate Pain (4 - 6)  hydrOXYzine hydrochloride 25 milliGRAM(s) Oral every 6 hours PRN anxiety  melatonin. 5 milliGRAM(s) Oral at bedtime PRN Insomnia

## 2023-10-05 NOTE — BH PSYCHOLOGY - CLINICIAN PSYCHOTHERAPY NOTE - NSBHPSYCHOLPROBS_PSY_ALL_CORE
Depression/Suicidality
Anxiety/Depression
Anxiety/Depression/Suicidality
Anxiety/Depression

## 2023-10-05 NOTE — BH PSYCHOLOGY - CLINICIAN PSYCHOTHERAPY NOTE - NSBHPSYCHOLINT_PSY_A_CORE FT
Acceptance & commitment therapy, Emotion regulation/coping skills taught, Psychoeducation 

## 2023-10-05 NOTE — BH INPATIENT PSYCHIATRY PROGRESS NOTE - NSBHCONSBHPROVDETAILS_PSY_A_CORE  FT
[facility]

## 2023-10-05 NOTE — BH INPATIENT PSYCHIATRY DISCHARGE NOTE - HOSPITAL COURSE
On admit patient presented with prominent anxious sx with associated depressed mood, hopelessness, poor concentration and passive SI (more active prior to admit).  She had only really been tx with Wellbutrin long term but no hx of SSRI trials.  Wellbutrin was discontinued and patient was started on Lexapro and Klonopin augmentation for anxiety and sleep.  Lexapro was titrated to 20mg PO daily and Klonopin was tapered and discontinued prior to discharge.  Pt was also very active in group and individual therapy.  On the combination of medication and therapy, patient showed much improvement in depressive sx, SI, anxiety and insomnia.  Pt's father and outpatient therapist were in contact with the treatment team and were in favor of patient going to an IOP or [facility] after discharge and patient was in agreement to take a leave from work to work on her mental health.  Pt wanted to be discharged before her birthday and put in a 72 hour letter requesting discharge.    Although patient was admitted due to risk factors for violence/suicide including depressive sx, anxiety and SI, the patient’s risk for suicide/violence was mitigated during her hospitalization and her protective factors outweigh her risk factors at this time.  Pt is currently at low acute risk for suicide/violence.  Patient’s protective factors include:  no current SI/HI/I/P, willingness to engage in treatment, no current substance abuse, responsibility to family, family support, engagement in work, no hx of aggression, residential stability, no hx of trauma, no prior hx of psychiatric hospitalizations, and no current acute depressive, psychotic or manic sx.  Although the patient is at chronic risk for violence/suicide given her hx of psychiatric illness and hx of substance abuse, this risk cannot be ameliorated by continued inpatient treatment.  The patient no longer met the criteria for psychiatric hospitalization at discharge.    On admit patient presented with prominent anxious sx with associated depressed mood, hopelessness, poor concentration and passive SI (more active prior to admit).  She had only really been tx with Wellbutrin long term but no hx of SSRI trials.  Wellbutrin was discontinued and patient was started on Lexapro and Klonopin augmentation for anxiety and sleep.  Lexapro was titrated to 20mg PO daily and Klonopin was tapered and discontinued prior to discharge.  Pt was also very active in group and individual therapy.  On the combination of medication and therapy, patient showed much improvement in depressive sx, SI, anxiety and insomnia.  Pt's father and outpatient therapist were in contact with the treatment team and were in favor of patient going to an IOP or [facility] after discharge and patient was in agreement to take a leave from work to work on her mental health.  Pt wanted to be discharged before her birthday and put in a 72 hour letter requesting discharge and SW was able to coordinate her having an intake at an IOP after discharge.    Although patient was admitted due to risk factors for violence/suicide including depressive sx, anxiety and SI, the patient’s risk for suicide/violence was mitigated during her hospitalization and her protective factors outweigh her risk factors at this time.  Pt is currently at low acute risk for suicide/violence.  Patient’s protective factors include:  no current SI/HI/I/P, willingness to engage in treatment, no current substance abuse, responsibility to family, family support, engagement in work, no hx of aggression, residential stability, no hx of trauma, no prior hx of psychiatric hospitalizations, and no current acute depressive, psychotic or manic sx.  Although the patient is at chronic risk for violence/suicide given her hx of psychiatric illness and hx of substance abuse, this risk cannot be ameliorated by continued inpatient treatment.  The patient no longer met the criteria for psychiatric hospitalization at discharge.

## 2023-10-05 NOTE — BH INPATIENT PSYCHIATRY PROGRESS NOTE - NSTXANXGOAL_PSY_ALL_CORE
Be able to perform ADLs and maintain safety despite anxiety/panic daily

## 2023-10-05 NOTE — BH INPATIENT PSYCHIATRY PROGRESS NOTE - NSBHASSESSSUMMFT_PSY_ALL_CORE
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 Manzano x2 years most recent appointment 9/21, tx with bupropion, buspirone, quetiapine, Rexulti engaged in individual therapy with Omayra Kolb x2 years, hx of suicide attempt in 7th grade by ingesting nyquil, making her mother aware who assisted pt in obtaining care with local ED (stomach pumped), no hx of NSSIB, denies problematic alcohol use over the last 15 years, previously 3-4 drinks/day, denies hx of withdrawals, denies seizures. presented to [facility] with  depression and suicidal ideation with thoughts of CO poisoning and some preparatory acts before aborting attempt and seeking care after her father called to check in while she was prepping.  requesting voluntary hospitalization.    On admit patient presents with prominent anxious sx with associated depressed mood, hopelessness, poor concentration and passive SI (more active prior to admit), has only really been tx with Wellbutrin + viloxazine (new Nor-epi reuptake inhibitor) and Seroquel, rexulti, but no hx of SSRI trials, seems likely that combination of wellbutrin + viloxazine may have been exacerbating anxiety and were without apparent efficacy for depressive sx, thus both discontinued, patient advised to try monotherapy with SSRI for 4-6 weeks before considering add on tx for concentration. Patient good PHP candidate after dc.     Depression and anxiety are improving and she denies current SI/I/P    Plan:   -admit to [facility] 6 on 9.13, to benefit from inpatient hospitalization for acute stabilization and safety in context of aborted SA.   -Appropriate for routine checks, not expected to pose danger to self or others in controlled inpatient setting  -Psychiatric:   DC'd wellbutrin XL 300mg and Buspar on 9/27  -dc viloxazine (likely worsening anxiety)  -started lexapro and titrate to 20mg PO daily on 10/3  -Klonopin discontinued   PRNS: dc ativan anxiety PRN to simplify regimen.   -Melatonin 5mg PO PRN at bedtime for insomnia  I/G/M therapy  -Medical: 10/4:  pt c/o constipation x 3 days with no relief from Senna or Miralax, reports using glycerin suppository at home.  Spoke to hospitalist, Dr. Arzate, who reported no contraindication to order glycerin suppository and she will review pt's bowel regimen  -Dispo: Patient is great candidate for PHP program or IOP after dc, at this time patient open, SW to f/u referrals.   collateral:  Treatment team spoke to patient's father and outpatient therapist who are on board with referral to PHP or IOP.

## 2023-10-05 NOTE — BH INPATIENT PSYCHIATRY DISCHARGE NOTE - OTHER PAST PSYCHIATRIC HISTORY (INCLUDE DETAILS REGARDING ONSET, COURSE OF ILLNESS, INPATIENT/OUTPATIENT TREATMENT)
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 Manzano x2 yr most recent appointment 9/21, [place] for therapy with Omayra Kolb x2 yrs, HX of suicide attempt in 7th grade by ingesting nyquil, making her mother aware who assisted pt in obtaining care with local ED (stomach pumped), no HX of NSSIB, denies alcohol use over the last 15 years, previously 3-4 drinks/day, denies HX of withdrawals, denies seizures and reports incidence of 1 black out prior to treatment.SW introduce herself to patient is discussed her role in PT's hospitalization.PT signed HIPPA consents to speak with her psychiatrist hot therapist and her father,SW to maintain contact.

## 2023-10-05 NOTE — BH PSYCHOLOGY - CLINICIAN PSYCHOTHERAPY NOTE - NSBHPSYCHOLASSESSPROV_PSY_A_CORE
Licensed Psychologist

## 2023-10-05 NOTE — BH INPATIENT PSYCHIATRY DISCHARGE NOTE - HPI (INCLUDE ILLNESS QUALITY, SEVERITY, DURATION, TIMING, CONTEXT, MODIFYING FACTORS, ASSOCIATED SIGNS AND SYMPTOMS)
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 Manzano x2 years most recent appointment 9/21, tx with bupropion, buspirone, quetiapine, Rexulti, engaged in individual therapy with Omayra Kolb x2 years, hx of suicide attempt in 7th grade by ingesting nyquil, making her mother aware who assisted pt in obtaining care with local ED (stomach pumped), no hx of NSSIB, denies alcohol use over the last 15 years, previously 3-4 drinks/day, denies hx of withdrawals, denies seizures and reports incidence of 1 black out prior to treatment. presented to [facility] with  depression and suicidal ideations with thoughts of CO poisoning without intent.  requesting voluntary hospitalization.

## 2023-10-05 NOTE — BH INPATIENT PSYCHIATRY DISCHARGE NOTE - NSBHMETABOLIC_PSY_ALL_CORE_FT
BMI:   HbA1c: A1C with Estimated Average Glucose Result: 5.2 % (09-27-23 @ 09:20)    Glucose:   BP: 114/76 (10-03-23 @ 07:45) (114/76 - 114/76)  Lipid Panel: Date/Time: 09-27-23 @ 09:20  Cholesterol, Serum: 137  Direct LDL: --  HDL Cholesterol, Serum: 59  Total Cholesterol/HDL Ration Measurement: --  Triglycerides, Serum: 80

## 2023-10-05 NOTE — BH INPATIENT PSYCHIATRY PROGRESS NOTE - NSBHATTESTBILLING_PSY_A_CORE
69970-Jabumgcjjm OBS or IP - moderate complexity OR 35-49 mins
67618-Jhxjeyrshd OBS or IP - moderate complexity OR 35-49 mins
59785-Pakkaxbjcc OBS or IP - moderate complexity OR 35-49 mins
38516-Csbkebzhzr OBS or IP - moderate complexity OR 35-49 mins
10498-Qdzbcdxkcw OBS or IP - high complexity OR 50-79 mins
38153-Enonkdkoak OBS or IP - moderate complexity OR 35-49 mins
85220-Dgzivofsgf OBS or IP - moderate complexity OR 35-49 mins
90349-Wreikbxizl OBS or IP - moderate complexity OR 35-49 mins

## 2023-10-05 NOTE — BH INPATIENT PSYCHIATRY PROGRESS NOTE - NSICDXBHTERTIARYDX_PSY_ALL_CORE
R/O Borderline personality disorder   F60.3  

## 2023-10-05 NOTE — BH INPATIENT PSYCHIATRY PROGRESS NOTE - NSTXDCOTHRINTERMD_PSY_ALL_CORE
SSRI + I/g/m Therapy

## 2023-10-05 NOTE — BH TREATMENT PLAN - NSTXPROBANX_PSY_ALL_CORE
Patient Instructions by Luis Alberto Mane APN, CNP at 04/20/18 01:48 PM     Author:  Luis Alberto Mane APN, CNP Service:  (none) Author Type:  Nurse Practitioner     Filed:  04/20/18 01:49 PM Encounter Date:  4/20/2018 Status:  Signed     :  Luis Alberto Mane APN, CNP (Nurse Practitioner)            Nasal congestion  (primary encounter diagnosis)  Plan: flonase, one spray to each nostril once a day  Daily zyrtec at bedtime  Call on Monday with progress update, if fever will start on ABX    Chronic pain of right knee  Plan: norco as needed  Under care of ortho at [facility]    Follow up as needed      Additional Educational Resources:  For additional resources regarding your symptoms, diagnosis, or further health information, please visit the Health Resources section on [email or web address] or the Online Health Resources section in [employer].        Revision History        User Key Date/Time User Provider Type Action    > [N/A] 04/20/18 01:49 PM Luis Alberto Mane APN, CNP Nurse Practitioner Sign             ANXIETY/PANIC/FEAR

## 2023-10-05 NOTE — BH PSYCHOLOGY - CLINICIAN PSYCHOTHERAPY NOTE - TOKEN PULL-DIAGNOSIS
Primary Diagnosis:  Severe single episode of major depressive disorder with anxiety [F32.2]      Depression, major [F32.9]        Problem Dx:   H/O eating disorder [Z86.59]      

## 2023-10-05 NOTE — BH INPATIENT PSYCHIATRY PROGRESS NOTE - NSBHFUPINTERVALHXFT_PSY_A_CORE
Chart reviewed, case discussed in team. No acute events overnight. Patient started lexapro 5mg yesterday and got 10mg this AM. Patient slept fairly last night, but had early morning awakening accompanied by considerable anxiety leading to some hopelessness and feeling overwhelmed. Pt tearfully recounts waking up with dread thinking about community and financial stressors. She requested ativan 1mg for anxiety but encouraged to wait for standing Klonopin to kick in and ultimately did not require the Ativan after meeting w team and going to group. Patient denies side effects from medications, accepting of reassurance and is engaged in I/G therapy. Tending appropriately to hygiene. Denies wishing she were dead today, no active SI. ordered for EKG
Chart reviewed, case discussed in team. No acute events overnight. Patient slept well last night, reports this AM woke up in considerably better spirits than yesterday. Did not feel she needed PRN medication and reports feeling some daytime sedation with Klonopin in AM. Pt also reported she had been taking Viloxazine for difficulty concentrating at work prior to admit. Engaged pt in psychoed about effects of meds, anxiety on attention/concentration which patient understood, amenable to treating anxiety and mood and giving lexapro full trial prior to starting additional meds. Patient reports today that she did not have any SI and overall is starting to feel more hopeful about her direction. Denies side effects from Lexapro. Participating well in group and individual therapy. Discussed potential for PHP after dc to which patient is amenable. 
Chart reviewed, including admit note, case discussed in team. Admit labs reviewed. Patient seen w SW and relayed the following:   Patient has been experiencing worsening mood and anxiety over the past 6 months, primary stressors related to work environment where she has been having difficulty executing some aspects of her job responsibilities and recent purchase of a home she feels she cannot afford. Anxiety came to a head prior to admit when patient began researching painless ways to end her life, she settled on CO poisioning but as she was preparing to do this her father called and convinced her to seek eval. Patient has been in tx for the last 2 years but tx with only Wellbutrin + Buspar, no SSRI hx, has hx of eating d/o restrictive and binge/purge but reports she doesn't feel is active, went to a residential tx facility in 2020 for this, at this time was told she seemed as though she had BPD. Patient at present endorsing hopelessness, passive SI, sleep disturbance, poor concentration and quite debilitating anxiety. She has recently begun a relationship and found her partner to be quite supportive. Discussed case with father who notes impulsivity related to spending has been chronic issue that he feels has led to considerable stress related to finances despite her well-paying job. Patient denies hx of SIB but endorses remote SA as a teenager. Pt also with hx of etoh use and drug use but has not used drugs in almost 20 years and though she occasionally drinks she feels it is not in excess and not currently a major problem for her. 
Pt compliant with medication and tolerating it well.  Chart reviewed and case discussed with treatment team.  No events reported overnight.  Pt c/o constipation x3days despite taking Miralax and Senna.  Pt reports she tends to be constipated and usually a glycerin suppository helps her when she is at home.  Pt reports she woke up anxious and took Atarax PRN, but overall her mood has been better since talking with the treatment team and her father.  Pt reports she is looking forward to leaving and attending an outpatient program.  Pt reports she put in a 72 hour letter yesterday because she wants to be discharged by Friday.  Pt denies current SI/I/P, but reports fleeting SI at times that goes away quickly and she moves on with her day.  Pt denies urges to use ETOH, reports she drank to offset her anxiety.  Pt reports her depression is 4/10 and was 11/10 on admission on a 10 point scale.  Pt denies HI/I/P or AH/VH or paranoia.  Pt eating and sleeping well.
Pt compliant with medication and tolerating it well.  Chart reviewed and case discussed with treatment team.  No events reported overnight.  Pt reports she spoke to her outpatient therapist and she is recommending patient attend PHP.  Pt reports her father is also encouraging it because he wants her to be okay.  Pt reports her mood is "okay," but she continues to feel anxious at times.  Pt reports depression continues to improve.  Pt reports it took her some time to get up and going with her day today.  Pt denies SI/I/P.  Pt reports she wants to do PHP or an IOP.  Pt reports she feels she needs more time to get well before she gets back to work because her job can be stressful at times.  Pt reports she is worried about her finances, but reports it seems her father will help her out.
f/up MDD w/ anxiety, VSS, Patient reported having SI on admission, reported that the turning point was realizing that she has increased social supports. Patient observed to be keeping to self in room. Patient reported fair sleep and decreased appetite. speech appears to be monotone, almost robotic. Denied SE from meds.  
Pt compliant with medication and tolerating it well.  Chart reviewed and case discussed with treatment team.  No events reported overnight.  Pt observed visible in the mileu, attending groups.  Pt denies SI/HI/I/P or AH/VH or paranoia.  Pt eating and sleeping well.  Pt reports she has been sleeping a lot and feels it might be her depression.  Pt also reports feeling anxious, but overall she feels her mood is better than before.  Pt declining a PHP after discharge, reports she has to get back to work because she is not getting paid while in the hospital.  Pt is considering going back intermittently and getting FMLA through her therapist.  Pt put in a 72 hour letter today, reports she wants to discharge by the end of the week.  Pt agrees to discontinue Klonopin and titrate Lexapro.
Pt compliant with medication and tolerating it well.  Chart reviewed and case discussed with treatment team.  No events reported overnight.  Pt reports she slept fairly last night and feels she will sleep better in her own bed.  Pt denies SI/I/P and is without HI/I/P or AH/VH or paranoia.  Pt reports she woke up anxious and took Atarax PRN.  Pt reports she is anxious about going home and to the real world, but reports it is a "good anxious."  Pt reports she is going to work with the psychologist today on her challenges such as work and how to deal with them.  Pt reports her father is going to help her with her finances which is a relief for her.  Pt reports her depression is not so bad today.  Pt feels ready for discharge tomorrow.

## 2023-10-05 NOTE — BH INPATIENT PSYCHIATRY DISCHARGE NOTE - NSDCMRMEDTOKEN_GEN_ALL_CORE_FT
escitalopram 20 mg oral tablet: 1 tab(s) orally once a day  hydrOXYzine hydrochloride 25 mg oral tablet: 1 tab(s) orally 2 times a day as needed for anxiety

## 2023-10-05 NOTE — BH PSYCHOLOGY - CLINICIAN PSYCHOTHERAPY NOTE - NSBHPTASSESSDT_PSY_A_CORE
03-Oct-2023 11:45
04-Oct-2023 14:00
29-Sep-2023 13:25
28-Sep-2023 11:10
05-Oct-2023 15:10
27-Sep-2023 13:30

## 2023-10-05 NOTE — BH INPATIENT PSYCHIATRY PROGRESS NOTE - MSE OPTIONS
Unstructured MSE
Unstructured MSE
Structured MSE
Unstructured MSE
Unstructured MSE
Structured MSE

## 2023-10-05 NOTE — BH DISCHARGE NOTE NURSING/SOCIAL WORK/PSYCH REHAB - NSDCPRGOAL_PSY_ALL_CORE
met with patient Writer met with patient in order to review progress toward rehabilitation goals, and assess current functioning. Patient was hospitalized due to worsening depression and SI. Patient made progress on the unit, in regards to her goals, which were to identify a safety plan. Patient was able to complete a safety plan, and identified multiple coping skills, as well as identified her early warning signs of relapse.   Patient attended most rehab groups on the unit with good contribution. Patient was social with peers and was visible in the community. Patient maintained her ADL and was compliant with her medication.   Patient completed a safety plan and was provided with a press ganey survey.

## 2023-10-05 NOTE — BH INPATIENT PSYCHIATRY PROGRESS NOTE - NSTXDEPRESINTERMD_PSY_ALL_CORE
Lexapro + I/g/M therapy

## 2023-10-05 NOTE — BH TREATMENT PLAN - NSTXSUICIDINTERPR_PSY_ALL_CORE
Psychiatric rehabilitation recommends patient attends 2-3 groups per day, engages in individual sessions and participates in activities on the milieu in order to explore strategies for relapose prevention.
Writer met with patient, to dicuss psychiatric rehabilitation goal. Patient goal was to develop a safety plan. Writer reviewd and patient completed there safety plan. Patient has completed there goal. For there new goal patient and writer are collabortaely dicussing new coping skills and how to utilize them properly.

## 2023-10-05 NOTE — BH INPATIENT PSYCHIATRY DISCHARGE NOTE - NSBHDCHANDOFFNOFT_PSY_A_CORE
Left message for call back from provider at [facility] ([phone]).  SW to fax discharge summary.  Can call Anisha Amaro NP at ([phone]) for handoff

## 2023-10-05 NOTE — BH INPATIENT PSYCHIATRY PROGRESS NOTE - NSTXSUICIDGOAL_PSY_ALL_CORE
Will develop a suicide prevention/safety plan

## 2023-10-05 NOTE — BH INPATIENT PSYCHIATRY PROGRESS NOTE - NSTXDCOTHRGOAL_PSY_ALL_CORE
Medication adjustment needed
PatPT could use higher level of care although is not in agreement with plan.
Medication adjustment needed
PatPT could use higher level of care although is not in agreement with plan.
PatPT could use higher level of care although is not in agreement with plan.
Medication adjustment needed

## 2023-10-05 NOTE — BH INPATIENT PSYCHIATRY PROGRESS NOTE - NSTXSUICIDINTERMD_PSY_ALL_CORE
psychoed, therapy, med adjustment

## 2023-10-05 NOTE — BH INPATIENT PSYCHIATRY DISCHARGE NOTE - NSBHFUPINTERVALHXFT_PSY_A_CORE
Pt compliant with medication and tolerating it well.  Chart reviewed and case discussed with treatment team.  No events reported overnight.  Pt reports she is anxious and excited to leave.  Pt reports her depression continues to improve and is manageable.  Pt denies SI/I/P.  Pt eating and sleeping.  Pt happy she got into an IOP program and is motivated to continue medication as prescribed and engage in outpatient treatment.  Pt looks forward to getting a massage for her birthday and having dinner with family.  Pt without HI/I/P or AH/VH or paranoia.

## 2023-10-05 NOTE — BH INPATIENT PSYCHIATRY PROGRESS NOTE - NSBHMETABOLIC_PSY_ALL_CORE_FT
BMI:   HbA1c: A1C with Estimated Average Glucose Result: 5.2 % (09-27-23 @ 09:20)    Glucose:   BP: 114/76 (10-03-23 @ 07:45) (114/76 - 114/76)  Lipid Panel: Date/Time: 09-27-23 @ 09:20  Cholesterol, Serum: 137  Direct LDL: --  HDL Cholesterol, Serum: 59  Total Cholesterol/HDL Ration Measurement: --  Triglycerides, Serum: 80  
BMI:   HbA1c: A1C with Estimated Average Glucose Result: 5.2 % (09-27-23 @ 09:20)    Glucose:   BP: 109/81 (09-28-23 @ 08:11) (109/81 - 148/94)  Lipid Panel: Date/Time: 09-27-23 @ 09:20  Cholesterol, Serum: 137  Direct LDL: --  HDL Cholesterol, Serum: 59  Total Cholesterol/HDL Ration Measurement: --  Triglycerides, Serum: 80  
BMI:   HbA1c: A1C with Estimated Average Glucose Result: 5.2 % (09-27-23 @ 09:20)    Glucose:   BP: 114/76 (10-03-23 @ 07:45) (111/87 - 114/76)  Lipid Panel: Date/Time: 09-27-23 @ 09:20  Cholesterol, Serum: 137  Direct LDL: --  HDL Cholesterol, Serum: 59  Total Cholesterol/HDL Ration Measurement: --  Triglycerides, Serum: 80  
BMI:   HbA1c: A1C with Estimated Average Glucose Result: 5.2 % (09-27-23 @ 09:20)    Glucose:   BP: 109/81 (09-28-23 @ 08:11) (109/81 - 109/81)  Lipid Panel: Date/Time: 09-27-23 @ 09:20  Cholesterol, Serum: 137  Direct LDL: --  HDL Cholesterol, Serum: 59  Total Cholesterol/HDL Ration Measurement: --  Triglycerides, Serum: 80  
BMI:   HbA1c: A1C with Estimated Average Glucose Result: 5.2 % (09-27-23 @ 09:20)    Glucose:   BP: 114/76 (10-03-23 @ 07:45) (114/76 - 114/76)  Lipid Panel: Date/Time: 09-27-23 @ 09:20  Cholesterol, Serum: 137  Direct LDL: --  HDL Cholesterol, Serum: 59  Total Cholesterol/HDL Ration Measurement: --  Triglycerides, Serum: 80  
BMI:   HbA1c: A1C with Estimated Average Glucose Result: 5.2 % (09-27-23 @ 09:20)    Glucose:   BP: 111/87 (10-01-23 @ 07:39) (111/87 - 111/87)  Lipid Panel: Date/Time: 09-27-23 @ 09:20  Cholesterol, Serum: 137  Direct LDL: --  HDL Cholesterol, Serum: 59  Total Cholesterol/HDL Ration Measurement: --  Triglycerides, Serum: 80  
BMI:   HbA1c: A1C with Estimated Average Glucose Result: 5.2 % (09-27-23 @ 09:20)    Glucose:   BP: 109/81 (09-28-23 @ 08:11) (109/81 - 148/94)  Lipid Panel: Date/Time: 09-27-23 @ 09:20  Cholesterol, Serum: 137  Direct LDL: --  HDL Cholesterol, Serum: 59  Total Cholesterol/HDL Ration Measurement: --  Triglycerides, Serum: 80  
BMI:   HbA1c: A1C with Estimated Average Glucose Result: 5.2 % (09-27-23 @ 09:20)    Glucose:   BP: 148/94 (09-26-23 @ 17:42) (148/94 - 148/94)  Lipid Panel: Date/Time: 09-27-23 @ 09:20  Cholesterol, Serum: 137  Direct LDL: --  HDL Cholesterol, Serum: 59  Total Cholesterol/HDL Ration Measurement: --  Triglycerides, Serum: 80

## 2023-10-05 NOTE — BH TREATMENT PLAN - NSTXPLANTHERAPYSESSIONSFT_PSY_ALL_CORE
09-29-23  Type of therapy: Psychoeducation, Relapse prevention  --  --  --  --  --    09-30-23  Type of therapy: Psychoeducation, Relapse prevention  --  --  --  --  --    10-01-23  Type of therapy: Psychoeducation, Relapse prevention  --  --  --  --  --    10-02-23  Type of therapy: Psychoeducation, Relapse prevention  --  --  --  --  --    10-04-23  Type of therapy: Cognitive behavior therapy, Coping skills, Leisure development, Peer advocate, Safety planning, Spirituality, Symptom management  Type of session: Individual  Level of patient participation: Engaged, Participates  Duration of participation: 15 minutes  Therapy conducted by: Psych rehab  Therapy Summary: Writer met with patient today to dicuss the patient there overall progress. In the meeting writer and patient reviewed the safety plan. Writer and patient dicuss discharge, and how the patient has been feeling. Patient stated they are feeling much better compared to when they first got there. Patient has stated she is getting discharge and is looking foward to it. Patient did state however she felt conspatated and could not go to the bathroom. Patient has stated that they have no SI/HI/AVH. Patient has been to and particpated in group sessions. Overall patient is doing well and is in great spirits.

## 2023-10-05 NOTE — BH INPATIENT PSYCHIATRY PROGRESS NOTE - NSBHFUPINTERVALCCFT_PSY_A_CORE
Pt seen f/u for depression and anxiety
Pt seen f/u for depression and anxiety
reported feeling "down". 
Pt seen f/u for depression and anxiety
Patient seen for follow up for MDE/anxiety/SI
Patient seen initially for aborted SA/depression/anxiety
Patient seen for follow up for MDE + SI	
Pt seen f/u for depression and anxiety

## 2023-10-05 NOTE — BH TREATMENT PLAN - NSTXDCOTHRGOAL_PSY_ALL_CORE
Medication adjustment needed
PatPT could use higher level of care although is not in agreement with plan.

## 2023-10-05 NOTE — BH TREATMENT PLAN - NSTXDCOTHRINTERSW_PSY_ALL_CORE
PT will comply with new medication regiment and identify benefits of the hearing to recommended protocol.
SW continues to encourage patient to accept recommendation for partial

## 2023-10-05 NOTE — BH INPATIENT PSYCHIATRY PROGRESS NOTE - NSBHATTESTAPPBILLTIME_PSY_A_CORE
I attest my time as GOLD is greater than 50% of the total combined time spent on qualifying patient care activities. I have reviewed and verified the documentation.

## 2023-10-05 NOTE — BH INPATIENT PSYCHIATRY DISCHARGE NOTE - NSDCCPCAREPLAN_GEN_ALL_CORE_FT
PRINCIPAL DISCHARGE DIAGNOSIS  Diagnosis: Recurrent severe major depressive disorder with anxiety  Assessment and Plan of Treatment:

## 2023-10-05 NOTE — BH INPATIENT PSYCHIATRY PROGRESS NOTE - NSTXANXINTERMD_PSY_ALL_CORE
SSRI + I/G/M therapy

## 2023-10-05 NOTE — BH INPATIENT PSYCHIATRY PROGRESS NOTE - NSICDXBHPRIMARYDX_PSY_ALL_CORE
Severe single episode of major depressive disorder with anxiety   F32.2  

## 2023-10-05 NOTE — BH INPATIENT PSYCHIATRY PROGRESS NOTE - NSTXDEPRESGOAL_PSY_ALL_CORE
Report using a coping skill to overcome sadness and worry in order to socialize with peers daily
Attend and participate in at least 2 groups daily despite low mood/energy
Report using a coping skill to overcome sadness and worry in order to socialize with peers daily

## 2023-10-05 NOTE — BH TREATMENT PLAN - NSCMSPTSTRENGTHS_PSY_ALL_CORE
Expressive of emotions/Future/goal oriented/Intact employment/Intelligence/Successful coping history/Supportive family
Compliance to treatment/Expressive of emotions/Future/goal oriented/Highly motivated for treatment/Intact employment/Intelligence/Interpersonal skills/Motivated/Physically healthy/Positive attitude/Successful coping history/Supportive family

## 2023-10-05 NOTE — BH INPATIENT PSYCHIATRY PROGRESS NOTE - NSICDXBHSECONDARYDX_PSY_ALL_CORE
H/O eating disorder   Z86.59  

## 2023-10-05 NOTE — BH DISCHARGE NOTE NURSING/SOCIAL WORK/PSYCH REHAB - PATIENT PORTAL LINK FT
You can access the FollowMyHealth Patient Portal offered by [facility] by registering at the following website: [email or web address]. By joining [facility]’s FollowMyHealth portal, you will also be able to view your health information using other applications (apps) compatible with our system.

## 2023-10-05 NOTE — BH DISCHARGE NOTE NURSING/SOCIAL WORK/PSYCH REHAB - DISCHARGE INSTRUCTIONS AFTERCARE APPOINTMENTS
Cellulitis In order to check the location, date, or time of your aftercare appointment, please refer to your Discharge Instructions Document given to you upon leaving the hospital.  If you have lost the instructions please call [phone]

## 2023-10-05 NOTE — BH PSYCHOLOGY - CLINICIAN PSYCHOTHERAPY NOTE - NSTXDEPRESGOAL_PSY_ALL_CORE
Report using a coping skill to overcome sadness and worry in order to socialize with peers daily
Report using a coping skill to overcome sadness and worry in order to socialize with peers daily
Attend and participate in at least 2 groups daily despite low mood/energy
Report using a coping skill to overcome sadness and worry in order to socialize with peers daily

## 2023-10-05 NOTE — BH PSYCHOLOGY - CLINICIAN PSYCHOTHERAPY NOTE - NSBHPSYCHOLNARRATIVE_PSY_A_CORE FT
Engaged in therapy session. Focused on creating space for pt's inner experiences. Pt described the void she feels as a result of not being a mother. She talked about her history of adoption and the wish to feel connected/understood by a biological parent. Thoughts of self-blame showed up ("I can't keep it together long enough to become a mother"). Pt described the grief/sadness of not being a mother in her heart. Supported pt in experiencing the feeling to highlight the importance of fully processing our emotional experiences. Pt then described the qualities underlying being a mother - loving, caring, boundary setting, firm, support, and attracting the energy you create. Offered room for the grief of not becoming a mother at this point in her life and also finding ways to live by the qualities she values in motherhood. Pt would like to spend more intentional time with her godchildren and nieces/nephews. Pt acknowledged she isolated herself  from others because she wasn't feeling positive. Pt open to reconnecting with the things that matter to her in life.      In terms of therapy goals, pt hopes to obsess and worry less, to have a better work-life integration, to be okay with the "gray," to "have some baseline of [place]," and to return to hobbies like biking.     Patient participated in psychotherapy session. Patient presented with adequate grooming and was casually dressed. Patient maintained appropriate eye contact and demonstrated a cooperative attitude. No abnormal motor movements noted. Patient's mood was sad with tearful and reactive affect. Speech was within normal limits. No perceptual disturbances noted or observed. Patient's thought process was linear and coherent. Patient's thought content was significant for grief and connection to values. Patient denied suicidal ideation/intent/plan. No HI endorsed. Insight is good and judgement fair.     
Engaged in psychotherapy session. Reflected on treatment gains and processed recent events on the unit. Pt shared feelings of anxiety in anticipation of her discharge. Writer normalized feelings and offered support. She discussed the challenge of having a "heart to heart" with her father about her finances and her employment search. She recognized the way her relationship changes with her father depending on circumstances; for example, she notices that he rises to the occasion when she is in crisis (e.g., being in the hospital). She discussed the expectations and regrets in her life and looks forward to allowing herself the space to heal and incorporate committed actions (e.g., on a daily basis read a book, walk). In reflecting on what she learned about herself, pt stated she knows the early warning signs to catch before having a full episode (impulsivity and isolation). Reinforced pt's gains and reflected on her openness to treating herself kindly through this process.     In terms of therapy goals, pt hopes to obsess and worry less, to have a better work-life integration, to be okay with the "gray," to "have some baseline of [place]," and to return to hobbies like biking.     Patient participated in psychotherapy session. Patient presented with adequate grooming and was casually dressed. Patient maintained appropriate eye contact and demonstrated a cooperative attitude. No abnormal motor movements noted. Patient's mood was anxious with tearful and reactive affect. Pt appeared to genuinely laugh and experience humor. Speech was within normal limits. No perceptual disturbances noted or observed. Patient's thought process was linear and coherent. Patient's thought content was significant for reflections on improvements ("I feel better prepared"). She denied suicidal ideation/intent/plan. No HI endorsed. Insight, judgement and impulse control are good    
Engaged in therapy session. Focused on reinforcement of cognitive defusion skill building. Pt discussed the utility of yesterday’s experiential exercise. We built on pt’s understanding of cognitive defusion through learning of labeling & noticing inner experiences. She practiced defusing from her thoughts/feelings by adding phrase "I'm having the feeling/thought that..." Pt recognized that labeling her thoughts/feelings this way affords her more choice in how to respond to the thought. Pt reflected that she may experience feelings of guilt regarding caretaking for her mother upon discharge. She stated that she could "place those feelings on her lap" so they do not control her behavior and becoming paralyzing. Pt also shared the benefits of taking time in the hospital to get out of the loop she found herself in and to reconnect with her values. Pt was offered next steps workbook. Provided empathy, skill building, and validation.     In terms of therapy goals, pt hopes to obsess and worry less, to have a better work-life integration, to be okay with the "gray," to "have some baseline of [place]," and to return to hobbies like biking.     Patient participated in psychotherapy session. Patient presented with adequate grooming and was casually dressed. Patient maintained appropriate eye contact and demonstrated a cooperative attitude. No abnormal motor movements noted. Patient's mood was euthymic with full and reactive affect. Pt appeared to genuinely laugh and experience humor. Speech was within normal limits. No perceptual disturbances noted or observed. Patient's thought process was linear and coherent. Patient's thought content was significant for cognitive defusion skill building. No suicidal ideation/intent/plan. No HI endorsed. Insight, judgement and impulse control are good    
Engaged in therapy session. Focused on emotional safety and validation. Pt became tearful in thinking about her decision to bring herself to the hospital and wondered whether outpatient care could have provided the same degree of support. Validated and invited pt’s reflections on her admission. Pt acknowledged the overwhelming sadness that compromised her safety prior to coming here. Pt labeled an array of inner experiences that culminated in her aborted attempt - hopeless thoughts (“you’ll never be important,” “lack of success because I don’t have kids,” burden to others), extreme sadness and frustration and dread. Pt recognized that her mind often criticizes her despite her direct experiences telling her otherwise (e.g., no one sends the message she is a burden). Pt responded well to feedback on the impact of her inner experiences on seeing/connecting to her values.     In terms of therapy goals, pt hopes to obsess and worry less, to have a better work-life integration, to be okay with the "gray," to "have some baseline of [place]ast[place]," and to return to hobbies like biking.     Patient participated in psychotherapy session. Patient presented with adequate grooming and was casually dressed. Patient maintained appropriate eye contact and demonstrated a cooperative attitude. No abnormal motor movements noted. Patient's mood was sad with tearful and reactive affect. Speech was within normal limits. No perceptual disturbances noted or observed. Patient's thought process was linear and coherent. Patient's thought content was significant for ambivalence about inpatient care. Patient denied active suicidal ideation/intent/plan. No HI endorsed. Insight is good and judgement fair.     
Engaged in therapy session. Focused on rapport establishment, gathering of relevant personal hx, and hopes for admission to the hospital. Pt succinctly provided narrative of what transpired in the weeks leading to her admission. She reflected on the lack of enjoyment in her work as a , and the worry/obsessions that have accompanied workplace stress. After purchasing a home (impulsively per pt), she felt increased pressure to perform at work and felt ineffective in her attempts to do so (received negative feedback from her supervisor). Pt stated that she contemplated suicide to end the pain that had worsened over the past three weeks. She became appropriately tearful in thinking about how her suicide could have impacted her loved ones. While pt noted that she has remained as a  for 6 years for the financial compensation, she aptly noted, "money can't help when I want to die." Pt looks forward to treatment engagement on the unit. She will benefit from work focused on emotional acceptance.    In terms of therapy goals, pt hopes to obsess and worry less, to have a better work-life integration, to be okay with the "gray," to "have some baseline of [place]ast[place]," and to return to hobbies like biking.     Patient participated in psychotherapy session. Patient presented with adequate grooming and was casually dressed. Patient maintained appropriate eye contact and demonstrated a cooperative attitude. No abnormal motor movements noted. Patient's mood was euthymic with constricted and reactive affect. Speech was within normal limits. No perceptual disturbances noted or observed. Patient's thought process was linear and coherent. Patient's thought content was significant for willingness to engage in treatment. Patient denied active suicidal ideation/intent/plan. Endorsed protective factors including family (parents, godchildren). No HI endorsed. Insight is good and judgement fair.     
Engaged in psychotherapy session. Focused on cognitive defusion skill building. Pt engaged in experiential experience (hands as thoughts and feelings exercise) to demonstrate concept of cognitive defusion. Pt reflected that the exercise helped her recognize how difficult it was to engage in meaningful activities, to do things that make her life work and stay focused when she fused with her inner experiences. Pt hopes to physically use this exercise as a coping tool to notice when her thoughts/feelings are pulling her away from important things in her life. Pt also noted that she is using her direct experience rather than her mind (in a depressive state) to drawl conclusions about how her “[place]” feels toward her. She shared that her father said “you will be my daughter no matter what” which brought on tears and gratitude in pt. Provided empathy, skill building, and validation.     In terms of therapy goals, pt hopes to obsess and worry less, to have a better work-life integration, to be okay with the "gray," to "have some baseline of Anastasia," and to return to hobbies like biking.     Patient participated in psychotherapy session. Patient presented with adequate grooming and was casually dressed. Patient maintained appropriate eye contact and demonstrated a cooperative attitude. No abnormal motor movements noted. Patient's mood was sad with tearful and reactive affect. Speech was within normal limits. No perceptual disturbances noted or observed. Patient's thought process was linear and coherent. Patient's thought content was significant for cognitive defusion skill building. Patient denied suicidal ideation/intent/plan. No HI endorsed. Insight, judgement and impulse control are good

## 2023-10-05 NOTE — BH INPATIENT PSYCHIATRY PROGRESS NOTE - NSTXDCOTHRPROGRES_PSY_ALL_CORE
No Change
No Change
Met - goal discontinued
No Change
No Change
Met - goal discontinued
Improving
No Change

## 2023-10-05 NOTE — BH INPATIENT PSYCHIATRY PROGRESS NOTE - NSDCCRITERIA_PSY_ALL_CORE
improvement in symptoms 

## 2023-10-05 NOTE — BH DISCHARGE NOTE NURSING/SOCIAL WORK/PSYCH REHAB - NSDCPRRECOMMEND_PSY_ALL_CORE
Patient will attend [facility] for ongoing medication management, support, and psychotherapy.

## 2023-10-05 NOTE — BH TREATMENT PLAN - NSTXDEPRESGOAL_PSY_ALL_CORE
Attend and participate in at least 2 groups daily despite low mood/energy
Report using a coping skill to overcome sadness and worry in order to socialize with peers daily

## 2023-10-05 NOTE — BH INPATIENT PSYCHIATRY PROGRESS NOTE - NSBHCHARTREVIEWVS_PSY_A_CORE FT
Vital Signs Last 24 Hrs  T(C): 36.7 (09-27-23 @ 07:42), Max: 36.7 (09-27-23 @ 07:42)  T(F): 98.1 (09-27-23 @ 07:42), Max: 98.1 (09-27-23 @ 07:42)  HR: --  BP: 148/94 (09-26-23 @ 17:42) (148/94 - 148/94)  BP(mean): --  RR: --  SpO2: --    Orthostatic VS  09-27-23 @ 07:42  Lying BP: --/-- HR: --  Sitting BP: 125/74 HR: 63  Standing BP: --/-- HR: --  Site: --  Mode: --  Orthostatic VS  09-26-23 @ 17:42  Lying BP: --/-- HR: --  Sitting BP: --/-- HR: --  Standing BP: --/-- HR: 64  Site: --  Mode: --  
Vital Signs Last 24 Hrs  T(C): 36.7 (10-03-23 @ 07:45), Max: 36.7 (10-03-23 @ 07:45)  T(F): 98 (10-03-23 @ 07:45), Max: 98 (10-03-23 @ 07:45)  HR: 68 (10-03-23 @ 07:45) (68 - 68)  BP: 114/76 (10-03-23 @ 07:45) (114/76 - 114/76)  BP(mean): --  RR: --  SpO2: --    Orthostatic VS  10-02-23 @ 08:09  Lying BP: --/-- HR: --  Sitting BP: 121/79 HR: 90  Standing BP: --/-- HR: --  Site: --  Mode: --  
Vital Signs Last 24 Hrs  T(C): 37.1 (10-04-23 @ 07:48), Max: 37.1 (10-04-23 @ 07:48)  T(F): 98.8 (10-04-23 @ 07:48), Max: 98.8 (10-04-23 @ 07:48)  HR: --  BP: --  BP(mean): --  RR: --  SpO2: --    Orthostatic VS  10-04-23 @ 07:48  Lying BP: 109/75 HR: 75  Sitting BP: --/-- HR: --  Standing BP: --/-- HR: --  Site: --  Mode: --  
Vital Signs Last 24 Hrs  T(C): 37.1 (09-29-23 @ 07:25), Max: 37.1 (09-29-23 @ 07:25)  T(F): 98.7 (09-29-23 @ 07:25), Max: 98.7 (09-29-23 @ 07:25)  HR: --  BP: --  BP(mean): --  RR: --  SpO2: --    Orthostatic VS  09-29-23 @ 07:25  Lying BP: --/-- HR: --  Sitting BP: 114/91 HR: 81  Standing BP: --/-- HR: --  Site: --  Mode: --  
Vital Signs Last 24 Hrs  T(C): 36.8 (10-05-23 @ 07:50), Max: 36.8 (10-05-23 @ 07:50)  T(F): 98.3 (10-05-23 @ 07:50), Max: 98.3 (10-05-23 @ 07:50)  HR: --  BP: --  BP(mean): --  RR: --  SpO2: --    Orthostatic VS  10-05-23 @ 07:50  Lying BP: --/-- HR: --  Sitting BP: 103/67 HR: 63  Standing BP: --/-- HR: --  Site: --  Mode: --  Orthostatic VS  10-04-23 @ 07:48  Lying BP: 109/75 HR: 75  Sitting BP: --/-- HR: --  Standing BP: --/-- HR: --  Site: --  Mode: --  
Vital Signs Last 24 Hrs  T(C): 37.1 (09-30-23 @ 07:55), Max: 37.1 (09-30-23 @ 07:55)  T(F): 98.7 (09-30-23 @ 07:55), Max: 98.7 (09-30-23 @ 07:55)  HR: --  BP: --  BP(mean): --  RR: --  SpO2: --    Orthostatic VS  09-30-23 @ 07:55  Lying BP: --/-- HR: --  Sitting BP: 113/80 HR: 90  Standing BP: --/-- HR: --  Site: --  Mode: --  Orthostatic VS  09-29-23 @ 07:25  Lying BP: --/-- HR: --  Sitting BP: 114/91 HR: 81  Standing BP: --/-- HR: --  Site: --  Mode: --  
Vital Signs Last 24 Hrs  T(C): 36.6 (09-28-23 @ 08:11), Max: 36.6 (09-28-23 @ 08:11)  T(F): 97.9 (09-28-23 @ 08:11), Max: 97.9 (09-28-23 @ 08:11)  HR: 82 (09-28-23 @ 08:11) (82 - 82)  BP: 109/81 (09-28-23 @ 08:11) (109/81 - 109/81)  BP(mean): --  RR: --  SpO2: --    Orthostatic VS  09-27-23 @ 07:42  Lying BP: --/-- HR: --  Sitting BP: 125/74 HR: 63  Standing BP: --/-- HR: --  Site: --  Mode: --  Orthostatic VS  09-26-23 @ 17:42  Lying BP: --/-- HR: --  Sitting BP: --/-- HR: --  Standing BP: --/-- HR: 64  Site: --  Mode: --  
Vital Signs Last 24 Hrs  T(C): 36.7 (10-02-23 @ 08:09), Max: 36.7 (10-02-23 @ 08:09)  T(F): 98.1 (10-02-23 @ 08:09), Max: 98.1 (10-02-23 @ 08:09)  HR: --  BP: --  BP(mean): --  RR: --  SpO2: --    Orthostatic VS  10-02-23 @ 08:09  Lying BP: --/-- HR: --  Sitting BP: 121/79 HR: 90  Standing BP: --/-- HR: --  Site: --  Mode: --

## 2023-10-06 VITALS — SYSTOLIC BLOOD PRESSURE: 98 MMHG | HEART RATE: 98 BPM | TEMPERATURE: 99 F | DIASTOLIC BLOOD PRESSURE: 70 MMHG

## 2023-10-06 PROCEDURE — 90853 GROUP PSYCHOTHERAPY: CPT

## 2023-10-06 RX ADMIN — Medication 25 MILLIGRAM(S): at 09:15

## 2023-10-06 RX ADMIN — ESCITALOPRAM OXALATE 20 MILLIGRAM(S): 10 TABLET, FILM COATED ORAL at 09:14

## 2023-10-06 RX ADMIN — PANTOPRAZOLE SODIUM 40 MILLIGRAM(S): 20 TABLET, DELAYED RELEASE ORAL at 09:14

## 2023-10-06 NOTE — BH PSYCHOLOGY - GROUP THERAPY NOTE - NSBHPTASSESSDT_PSY_A_CORE
03-Oct-2023 10:15
29-Sep-2023 10:15
27-Sep-2023 10:15
06-Oct-2023 10:15
28-Sep-2023 10:15
04-Oct-2023 10:15
02-Oct-2023 10:15
05-Oct-2023 10:15

## 2023-10-06 NOTE — BH PSYCHOLOGY - GROUP THERAPY NOTE - NSPSYCHOLGRPCOGPROB_PSY_A_CORE FT
Anxiety, Depression, Emotion dysregulation, Lack of coping skills 

## 2023-10-06 NOTE — BH PSYCHOLOGY - GROUP THERAPY NOTE - NSBHPSYCHOLRESPONSE_PSY_A_CORE
Coping skills acquired/Accepted support

## 2023-10-06 NOTE — BH PSYCHOLOGY - GROUP THERAPY NOTE - NSBHPSYCHOLPARTICIPCOMMENT_PSY_A_CORE FT
Pt. engaged appropriately in group. Pt. was alert and oriented throughout the group. During check-in, pt. shared one positive moment from the week. Pt. participated in the mindfulness exercise and shared her experiences with the group. Pt. reported that she was distracted by "thoughts of the future" during the exercise. Pt. participated in the stretching exercise at checkout. 
Pt engaged well in group. She discussed her [place] of support people who encouraged her to come to the hospital. Pt supported peers and shared relevant words connected to support (responsibility). 
Pt. engaged appropriately in group. She shared that her superpower would involve flying. During self compassion activity, pt shared that had she treated herself like she would treat a friend in distress, she would have reached out for help sooner. Pt wants to remind herself today that "I am worthy of help." 
Pt engaged in group. She shared that she was consumed with negativity prior to coming here and focused exclusively on work at the expense of her family, particularly her godchildren. 
Pt engaged in group. She appeared sleepy at times. Pt listened to peers and shared her current feelings of sadness. Pt received support and empathy from the group. 
Pt. engaged appropriately in group. She mindfully listened to the songs, shared her perspectives on the qualities of the music, and reflected on her time here (as pt is leaving today). 
Pt. engaged meaningfully in group. Pt actively participated by sharing her perspective on self-compassion. Pt reported that practicing self-compassion is hard. She wrote responses on the unit chalkboard and supported peers.  
Pt. engaged appropriately in group. Pt. was alert and oriented to the best of her ability. Pt. participated in stretching exercise. During check-in pt shared one example of what she likes to do in this kind of weather. Pt. listened as others shared their experiences and reflections.

## 2023-10-06 NOTE — BH PSYCHOLOGY - GROUP THERAPY NOTE - NSPSYCHOLGRPCOGPT_PSY_A_CORE
participated in exercise/shared positive coping experience/other...
shared negative coping experience/shared positive coping experience/gave feedback to others/other...
participated in exercise/shared positive coping experience/other...
participated in exercise/shared positive coping experience/other...
participated in exercise/other...
shared negative coping experience/shared positive coping experience/gave feedback to others/other...
participated in exercise/shared positive coping experience/other...
participated in exercise/shared positive coping experience/other...

## 2023-10-06 NOTE — BH PSYCHOLOGY - GROUP THERAPY NOTE - NSPSYCHOLGRPCOGGOAL_PSY_A_CORE FT
Decrease symptoms, Develop coping/emotion regulation skills, Increase value-based action, Psychoeducation 

## 2023-10-06 NOTE — BH PSYCHOLOGY - GROUP THERAPY NOTE - NSBHPSYCHOLPARTICIP_PSY_A_CORE
Fully engaged
Fully engaged
Partially engaged
Fully engaged
Partially engaged
Partially engaged
Fully engaged
Fully engaged

## 2023-10-06 NOTE — BH PSYCHOLOGY - GROUP THERAPY NOTE - NSPSYCHOLGRPBILLING_PSY_A_CORE
[id number] - Group Psychotherapy
[id number] - Group Psychotherapy
[id number] - Group Psychotherapy
[id number] - Group Psychotherapy
[id number] - Group Psychotherapy
[id number] - Group Psychotherapy
[id number] - Group Psychotherapy
[id number] - Group Psychotherapy

## 2023-10-06 NOTE — BH PSYCHOLOGY - GROUP THERAPY NOTE - NSPSYCHOLGRPCOGPT_PSY_A_CORE FT
Patient attended recovery oriented/acceptance & commitment therapy group. Group started with gratitude check in (one thing for which you are grateful today). Members reported having gratitude for their support system, their lives, having sobriety, and spiritualty. Group focused on the concept of self-compassion. Members created messaging about self-compassion on the unit [employer] that contained three sections – “Compassion is,” “Self-compassion says,” and “Self compassionate behavior looks like.” Members identified relevant words like “love, respect, caring, strength, warmth” to define compassion. They reflected on how they can use self-compassion in words (“I made a mistake but I am not a mistake,” “I forgive myself”) and demonstrate self-compassion in action (hugs, food, coffee, nature). Members supported and encouraged one another through this activity which facilitator highlighted as a form of self-compassion.  facilitated discussion of concepts, encouraged active participation, and supported members providing feedback to peers. Group concluded with checkout prompt – how can I use self-compassion to guide my day.
Patient attended recovery oriented/acceptance & commitment therapy group. The group started with a brief stretching exercise and the check in question: "What is one thing you like to do in this kind of weather?". The group then focused on topics of acceptance of emotions. Patients reflected and discussed the "Myths of Emotions" handout. Patients shared examples how they experience and react to emotions, giving examples from their own experiences as well as experiences on the unit.  facilitated discussion of concepts, encouraged active participation, and supported members providing feedback to peers.  The group concluded with a summary and checkout. 
Patient attended recovery oriented/acceptance & commitment therapy group. Group started with check in question - music genre that you negatively . Members shared genres like metal, country, pop and EDM. We discussed where their negative reactions come from and ways of noticing those reactions. Members were introduced to mindful listening - listening with intention without judgement or criticism. Members shared their impressions of the genres - country, metal, EDM, and pop including how they were surprised, disengaged, or curious. We explored the purpose of paying attention without judgment in the service of being open to new experiences.  facilitated discussion of concepts, encouraged active participation, and supported members providing feedback to peers.  The group concluded with a checkout and discussion of daily goals. 
Patient attended recovery oriented/acceptance & commitment therapy group. Group started with check in question, what gift would you give yourself? Members responded with self care activities (massage, body wash), genuine happiness, and confidence. Group then reflected on and processed their understanding of happiness, what happened prior to their admission, and the inaccessibility of rosario in their lives. Members related to one another in their experiences of negativity and supported each other in safely sharing their experiences. Members then considered what makes life worth living and where they find their meaning. Group insightfully shared their personal meaning - making a positive impact, connecting with loved ones.  facilitated discussion of concepts, encouraged active participation, and supported members providing feedback to peers. The group concluded with emotional checkout. 
Patient attended recovery oriented/acceptance & commitment therapy group. Group started with feelings check in - members provided peer support via assurance and relating to one another's experiences. Group used "next steps after you leave the hospital" handout to steer dialogue towards productive and proactive ways of preparing for discharge. Members reflected on what's important to them (being a productive member of society, treating others kindly, spirituality, and family), their goals (health and self care). Members then focused on ways to incorporate valued activities and meaningful ways of coping into their routines (stretching, walking, family and rest). Members agreed to complete handout tomorrow in group.  facilitated discussion of concepts, encouraged active participation, and supported members providing feedback to peers. 
Patient attended recovery oriented/acceptance & commitment therapy group. Group started with check in prompt to encourage discussion on support and trust. Members responded to question about where they turn to for support. Members identified select family members, friends, and Ayaz. Some members reported that they keep their issues to themselves and opt to go for a walk. We explored the importance of trust in support - group offered words that come to mind in relation to trust (friendship, truth, responsibility, loyalty and honesty). Members reflected on past experiences of trusting others and were encouraged to rate their current satisfaction with support in their lives. Group then transitioned to activity to stimulate communication and collaboration between group members via card game. Group concluded with summary of group concepts and facilitator motivated members to continue finding ways to connect with each other.  facilitated discussion of concepts, encouraged active participation, and supported members providing feedback to peers. 
Patient attended recovery oriented/acceptance & commitment therapy group. Engaged in group therapy. Group started with check in question - if you could have any superpower, what would it be? Members shared their wishes to fly, teleport, and be invisible. Group then shifted towards self compassion exercise. Members completed “Treat Yourself like a Friend” worksheet where they responded to prompts on how they have treated loved ones during hard times and how they treat themselves during hard times. Themes of criticism and judgment surfaced when members reflected on how they treat themselves compared to how they treat others. Group concluded with checkout question - one behavior to engage in today that reflects self compassion.  facilitated discussion of concepts, encouraged active participation, and supported members providing feedback to peers. 
Patient attended recovery oriented/acceptance & commitment therapy group. The group started with the brief check in question: "What's one positive experience from the past week?". The group then focused on topics of acceptance and mindfulness. Patients participated in a visualization mindfulness exercise which asked them to imagine themselves sitting beside a stream and placing their thoughts on the leaves as they floated by. Patients shared examples of what they liked about the exercise, as well as what made it difficult.  facilitated discussion of concepts, encouraged active participation, and supported members providing feedback to peers. The group concluded with a stretching exercise at checkout facilitated by a group member.

## 2023-10-06 NOTE — BH PSYCHOLOGY - GROUP THERAPY NOTE - TOKEN PULL-DIAGNOSIS
Primary Diagnosis:  Severe single episode of major depressive disorder with anxiety [F32.2]      Depression, major [F32.9]        Problem Dx:   H/O eating disorder [Z86.59]      
Primary Diagnosis:  Depression, major [F32.9]        Problem Dx:   
Primary Diagnosis:  Severe single episode of major depressive disorder with anxiety [F32.2]      Depression, major [F32.9]        Problem Dx:   H/O eating disorder [Z86.59]      
Primary Diagnosis:  Severe single episode of major depressive disorder with anxiety [F32.2]      Depression, major [F32.9]        Problem Dx:   H/O eating disorder [Z86.59]

## 2023-10-06 NOTE — BH PSYCHOLOGY - GROUP THERAPY NOTE - NSBHPSYCHOLASSESSPROV_PSY_A_CORE
Licensed Psychologist
Licensed Psychologist and Psychology Trainee
Licensed Psychologist and Psychology Trainee
Licensed Psychologist
Licensed Psychologist and Psychology Trainee

## 2023-10-06 NOTE — BH PSYCHOLOGY - GROUP THERAPY NOTE - NSPSYCHOLGRPCOGINT_PSY_A_CORE FT
Acceptance & commitment therapy, Emotion regulation/coping skills taught, Psychoeducation 

## 2023-10-12 DIAGNOSIS — F32.A DEPRESSION, UNSPECIFIED: ICD-10-CM

## 2023-10-13 NOTE — ASU PATIENT PROFILE, ADULT - PATIENT'S HEIGHT AND WEIGHT RECORDED IN THE VITAL SIGNS FLOWSHEET
Hemigard Intro: Due to skin fragility and wound tension, it was decided to use HEMIGARD adhesive retention suture devices to permit a linear closure. The skin was cleaned and dried for a 6cm distance away from the wound. Excessive hair, if present, was removed to allow for adhesion. yes

## 2023-11-13 NOTE — DIETITIAN INITIAL EVALUATION ADULT - ENTER FROM (CAL/KG)
Last OV: 06/27/2023  Last labs:02/21/2023  Next OV and labs: 11/28/2023   
This pharmacy faxed over request for the following prescriptions to be filled:    Medication requested :   Requested Prescriptions     Pending Prescriptions Disp Refills    pramipexole (MIRAPEX) 0.125 MG tablet [Pharmacy Med Name: PRAMIPEXOLE 0.125MG TABLETS] 180 tablet 1     Sig: TAKE 1 TABLET BY MOUTH TWICE DAILY     PCP: [facility]  Pharmacy or Print: [facility]   Mail order or Local pharmacy [id number]     Scheduled appointment if not seen by current providers in office: LOV 6/27/2023 OPAL 11/28/2023    
25

## 2024-04-19 ENCOUNTER — OUTPATIENT (OUTPATIENT)
Dept: OUTPATIENT SERVICES | Facility: HOSPITAL | Age: 44
LOS: 1 days | Discharge: HOME | End: 2024-04-19
Payer: COMMERCIAL

## 2024-04-19 VITALS
WEIGHT: 222.01 LBS | HEIGHT: 66 IN | SYSTOLIC BLOOD PRESSURE: 109 MMHG | HEART RATE: 56 BPM | OXYGEN SATURATION: 100 % | DIASTOLIC BLOOD PRESSURE: 67 MMHG | RESPIRATION RATE: 13 BRPM | TEMPERATURE: 97 F

## 2024-04-19 DIAGNOSIS — K21.9 GASTRO-ESOPHAGEAL REFLUX DISEASE WITHOUT ESOPHAGITIS: ICD-10-CM

## 2024-04-19 DIAGNOSIS — Z98.84 BARIATRIC SURGERY STATUS: Chronic | ICD-10-CM

## 2024-04-19 DIAGNOSIS — Z98.890 OTHER SPECIFIED POSTPROCEDURAL STATES: Chronic | ICD-10-CM

## 2024-04-19 DIAGNOSIS — E66.01 MORBID (SEVERE) OBESITY DUE TO EXCESS CALORIES: ICD-10-CM

## 2024-04-19 LAB — HCG UR QL: NEGATIVE — SIGNIFICANT CHANGE UP

## 2024-04-19 PROCEDURE — 88313 SPECIAL STAINS GROUP 2: CPT

## 2024-04-19 PROCEDURE — 88313 SPECIAL STAINS GROUP 2: CPT | Mod: 26

## 2024-04-19 PROCEDURE — 88342 IMHCHEM/IMCYTCHM 1ST ANTB: CPT

## 2024-04-19 PROCEDURE — 81025 URINE PREGNANCY TEST: CPT

## 2024-04-19 PROCEDURE — 88305 TISSUE EXAM BY PATHOLOGIST: CPT

## 2024-04-19 PROCEDURE — 88312 SPECIAL STAINS GROUP 1: CPT | Mod: 26

## 2024-04-19 PROCEDURE — 88305 TISSUE EXAM BY PATHOLOGIST: CPT | Mod: 26

## 2024-04-19 PROCEDURE — 88312 SPECIAL STAINS GROUP 1: CPT

## 2024-04-19 PROCEDURE — 88342 IMHCHEM/IMCYTCHM 1ST ANTB: CPT | Mod: 26

## 2024-04-19 NOTE — ASU PATIENT PROFILE, ADULT - URINARY CATHETER
Subjective     Patient ID: Aury Lau is a 66 y.o. female who presents for Diabetes.    Referring Provider: Valarie Vides MD     Diabetes  She presents for her follow-up diabetic visit. She has type 2 diabetes mellitus. Symptoms are stable.       Allergies   Allergen Reactions    Gadobutrol Itching     This is to MRI contrast. Endy Payton    Grape Other     Grapes=Dizziness.    Iodides Hives    Iodinated Contrast Media Hives    Nut - Unspecified Diarrhea, Swelling and Other     Dizziness peanuts    Other Itching    Penicillins Hives and Itching    Raspberry Itching    Strawberry Itching       Objective     Current DM Pharmacotherapy:   Metformin 1000 mg; take 1 tablet by mouth daily  Ozempic 0.25 mg or 0.5 mg/dose; inject 0.25 mg under the skin for 4 weeks, then increase to 0.5 mg weekly    SECONDARY PREVENTION  - Statin? Yes  - ACE-I/ARB? Yes  - Aspirin? Yes    Current monitoring regimen:   Patient is using: patient needs a new BG monitor/ strips/ lancets sent to a  pharmacy, old prescriptions sent in to a [facility] which is now closed.     Pertinent PMH Review:  - PMH of Pancreatitis: No  - PMH/FH of Medullary Thyroid Cancer: No  - PMH of Retinopathy: No  - PMH of Urinary Tract Infections: patient has a history of frequent yeast infections    Lab Review  Lab Results   Component Value Date    BILITOT 0.7 08/14/2023    CALCIUM 10.3 08/14/2023    CO2 28 08/14/2023    CL 99 08/14/2023    CREATININE 1.15 (H) 08/14/2023    GLUCOSE 350 (H) 08/14/2023    ALKPHOS 73 08/14/2023    K 4.6 08/14/2023    PROT 7.1 08/14/2023     08/14/2023    AST 34 08/14/2023    ALT 38 08/14/2023    BUN 17 08/14/2023    ANIONGAP 14 08/14/2023    ALBUMIN 4.3 08/14/2023    GFRF 53 (A) 08/14/2023     Lab Results   Component Value Date    TRIG 126 08/14/2023    CHOL 107 08/14/2023    HDL 40.5 08/14/2023     Lab Results   Component Value Date    HGBA1C 13.8 (A) 08/14/2023     The ASCVD Risk score (Jyoti DK, et al., 2019) failed to calculate for  the following reasons:    The patient has a prior MI or stroke diagnosis      Assessment/Plan     Problem List Items Addressed This Visit       Type 2 diabetes mellitus (CMS/[facility]) - Primary     Patient's diabetes is not currently well controlled. Her most recent A1c from 8/14/23 was 13.8% which is above her goal A1c of <7%. Patient will do the final dose of the Ozempic 0.25 mg today and should be ready to go up to the 0.5 mg dose next week. Patient needs new prescriptions for testing supplies sent to a  pharmacy to be mailed, the old prescription was sent to a [facility] which has since closed.     PLAN:  Continue:  Metformin 1000 mg; take 1 tablet by mouth daily  Ozempic 0.25 mg or 0.5 mg/ dose; inject 0.5 mg under the skin weekly (will finish the 0.25 mg dose on 12/27).            Type 2 diabetes mellitus, is not at goal. Goal A1C: <7%    Follow up: I recommend diabetes care be 3 weeks.    Shahrzad Ortiz, Pharm. D.  PGY-1 Pharmacy Resident    Continue all meds under the continuation of care with the referring provider and clinical pharmacy team   no

## 2024-04-19 NOTE — ASU PATIENT PROFILE, ADULT - FALL HARM RISK - UNIVERSAL INTERVENTIONS
Bed in lowest position, wheels locked, appropriate side rails in place/Call bell, personal items and telephone in reach/Instruct patient to call for assistance before getting out of bed or chair/Non-slip footwear when patient is out of bed/[place] to call system/Physically safe environment - no spills, clutter or unnecessary equipment/Purposeful Proactive Rounding/Room/bathroom lighting operational, light cord in reach

## 2024-04-23 LAB — SURGICAL PATHOLOGY STUDY: SIGNIFICANT CHANGE UP

## 2024-04-25 DIAGNOSIS — Z98.84 BARIATRIC SURGERY STATUS: ICD-10-CM

## 2024-04-25 DIAGNOSIS — K21.9 GASTRO-ESOPHAGEAL REFLUX DISEASE WITHOUT ESOPHAGITIS: ICD-10-CM

## 2024-04-25 DIAGNOSIS — K29.50 UNSPECIFIED CHRONIC GASTRITIS WITHOUT BLEEDING: ICD-10-CM

## 2024-04-25 DIAGNOSIS — K22.70 BARRETT'S ESOPHAGUS WITHOUT DYSPLASIA: ICD-10-CM

## 2024-04-25 DIAGNOSIS — Z79.84 LONG TERM (CURRENT) USE OF ORAL HYPOGLYCEMIC DRUGS: ICD-10-CM

## 2024-04-25 DIAGNOSIS — F41.9 ANXIETY DISORDER, UNSPECIFIED: ICD-10-CM

## 2024-04-25 DIAGNOSIS — K20.90 ESOPHAGITIS, UNSPECIFIED WITHOUT BLEEDING: ICD-10-CM

## 2024-04-25 DIAGNOSIS — E66.09 OTHER OBESITY DUE TO EXCESS CALORIES: ICD-10-CM

## 2024-06-17 ENCOUNTER — OUTPATIENT (OUTPATIENT)
Dept: OUTPATIENT SERVICES | Facility: HOSPITAL | Age: 44
LOS: 1 days | End: 2024-06-17
Payer: COMMERCIAL

## 2024-06-17 ENCOUNTER — RESULT REVIEW (OUTPATIENT)
Age: 44
End: 2024-06-17

## 2024-06-17 VITALS
DIASTOLIC BLOOD PRESSURE: 84 MMHG | HEIGHT: 66 IN | SYSTOLIC BLOOD PRESSURE: 101 MMHG | WEIGHT: 211.86 LBS | RESPIRATION RATE: 16 BRPM | TEMPERATURE: 98 F | OXYGEN SATURATION: 100 % | HEART RATE: 65 BPM

## 2024-06-17 DIAGNOSIS — Z98.890 OTHER SPECIFIED POSTPROCEDURAL STATES: Chronic | ICD-10-CM

## 2024-06-17 DIAGNOSIS — Z98.84 BARIATRIC SURGERY STATUS: Chronic | ICD-10-CM

## 2024-06-17 DIAGNOSIS — Z90.3 ACQUIRED ABSENCE OF STOMACH [PART OF]: Chronic | ICD-10-CM

## 2024-06-17 DIAGNOSIS — Z01.818 ENCOUNTER FOR OTHER PREPROCEDURAL EXAMINATION: ICD-10-CM

## 2024-06-17 LAB
ALBUMIN SERPL ELPH-MCNC: 3.9 G/DL — SIGNIFICANT CHANGE UP (ref 3.3–5)
ANION GAP SERPL CALC-SCNC: 5 MMOL/L — SIGNIFICANT CHANGE UP (ref 5–17)
APPEARANCE UR: CLEAR — SIGNIFICANT CHANGE UP
APTT BLD: 34.7 SEC — SIGNIFICANT CHANGE UP (ref 24.5–35.6)
BACTERIA # UR AUTO: NEGATIVE /HPF — SIGNIFICANT CHANGE UP
BASOPHILS # BLD AUTO: 0.03 K/UL — SIGNIFICANT CHANGE UP (ref 0–0.2)
BASOPHILS NFR BLD AUTO: 0.4 % — SIGNIFICANT CHANGE UP (ref 0–2)
BILIRUB UR-MCNC: NEGATIVE — SIGNIFICANT CHANGE UP
BLD GP AB SCN SERPL QL: SIGNIFICANT CHANGE UP
BUN SERPL-MCNC: 15 MG/DL — SIGNIFICANT CHANGE UP (ref 7–23)
CALCIUM SERPL-MCNC: 9.4 MG/DL — SIGNIFICANT CHANGE UP (ref 8.5–10.1)
CAST: 0 /LPF — SIGNIFICANT CHANGE UP (ref 0–4)
CHLORIDE SERPL-SCNC: 104 MMOL/L — SIGNIFICANT CHANGE UP (ref 96–108)
CO2 SERPL-SCNC: 28 MMOL/L — SIGNIFICANT CHANGE UP (ref 22–31)
COHGB MFR BLDV: 1.7 % — SIGNIFICANT CHANGE UP
COLOR SPEC: YELLOW — SIGNIFICANT CHANGE UP
CREAT SERPL-MCNC: 0.88 MG/DL — SIGNIFICANT CHANGE UP (ref 0.5–1.3)
DIFF PNL FLD: NEGATIVE — SIGNIFICANT CHANGE UP
EGFR: 84 ML/MIN/1.73M2 — SIGNIFICANT CHANGE UP
EOSINOPHIL # BLD AUTO: 0.06 K/UL — SIGNIFICANT CHANGE UP (ref 0–0.5)
EOSINOPHIL NFR BLD AUTO: 0.8 % — SIGNIFICANT CHANGE UP (ref 0–6)
GLUCOSE SERPL-MCNC: 83 MG/DL — SIGNIFICANT CHANGE UP (ref 70–99)
GLUCOSE UR QL: NEGATIVE MG/DL — SIGNIFICANT CHANGE UP
HCT VFR BLD CALC: 40.5 % — SIGNIFICANT CHANGE UP (ref 34.5–45)
HGB BLD CALC-MCNC: 14.4 G/DL — SIGNIFICANT CHANGE UP (ref 11.7–16.1)
HGB BLD-MCNC: 13.9 G/DL — SIGNIFICANT CHANGE UP (ref 11.5–15.5)
IMM GRANULOCYTES NFR BLD AUTO: 0.3 % — SIGNIFICANT CHANGE UP (ref 0–0.9)
INR BLD: 1 RATIO — SIGNIFICANT CHANGE UP (ref 0.85–1.18)
KETONES UR-MCNC: NEGATIVE MG/DL — SIGNIFICANT CHANGE UP
LEUKOCYTE ESTERASE UR-ACNC: ABNORMAL
LYMPHOCYTES # BLD AUTO: 3.27 K/UL — SIGNIFICANT CHANGE UP (ref 1–3.3)
LYMPHOCYTES # BLD AUTO: 42.5 % — SIGNIFICANT CHANGE UP (ref 13–44)
MCHC RBC-ENTMCNC: 31.2 PG — SIGNIFICANT CHANGE UP (ref 27–34)
MCHC RBC-ENTMCNC: 34.3 GM/DL — SIGNIFICANT CHANGE UP (ref 32–36)
MCV RBC AUTO: 90.8 FL — SIGNIFICANT CHANGE UP (ref 80–100)
MONOCYTES # BLD AUTO: 0.7 K/UL — SIGNIFICANT CHANGE UP (ref 0–0.9)
MONOCYTES NFR BLD AUTO: 9.1 % — SIGNIFICANT CHANGE UP (ref 2–14)
NEUTROPHILS # BLD AUTO: 3.61 K/UL — SIGNIFICANT CHANGE UP (ref 1.8–7.4)
NEUTROPHILS NFR BLD AUTO: 46.9 % — SIGNIFICANT CHANGE UP (ref 43–77)
NITRITE UR-MCNC: NEGATIVE — SIGNIFICANT CHANGE UP
PH UR: 6 — SIGNIFICANT CHANGE UP (ref 5–8)
PLATELET # BLD AUTO: 311 K/UL — SIGNIFICANT CHANGE UP (ref 150–400)
POTASSIUM SERPL-MCNC: 3.9 MMOL/L — SIGNIFICANT CHANGE UP (ref 3.5–5.3)
POTASSIUM SERPL-SCNC: 3.9 MMOL/L — SIGNIFICANT CHANGE UP (ref 3.5–5.3)
PROT UR-MCNC: NEGATIVE MG/DL — SIGNIFICANT CHANGE UP
PROTHROM AB SERPL-ACNC: 11.3 SEC — SIGNIFICANT CHANGE UP (ref 9.5–13)
RBC # BLD: 4.46 M/UL — SIGNIFICANT CHANGE UP (ref 3.8–5.2)
RBC # FLD: 12.5 % — SIGNIFICANT CHANGE UP (ref 10.3–14.5)
RBC CASTS # UR COMP ASSIST: 0 /HPF — SIGNIFICANT CHANGE UP (ref 0–4)
SODIUM SERPL-SCNC: 137 MMOL/L — SIGNIFICANT CHANGE UP (ref 135–145)
SP GR SPEC: 1.01 — SIGNIFICANT CHANGE UP (ref 1–1.03)
SQUAMOUS # UR AUTO: 1 /HPF — SIGNIFICANT CHANGE UP (ref 0–5)
UROBILINOGEN FLD QL: 0.2 MG/DL — SIGNIFICANT CHANGE UP (ref 0.2–1)
WBC # BLD: 7.69 K/UL — SIGNIFICANT CHANGE UP (ref 3.8–10.5)
WBC # FLD AUTO: 7.69 K/UL — SIGNIFICANT CHANGE UP (ref 3.8–10.5)
WBC UR QL: 1 /HPF — SIGNIFICANT CHANGE UP (ref 0–5)

## 2024-06-17 PROCEDURE — 85025 COMPLETE CBC W/AUTO DIFF WBC: CPT

## 2024-06-17 PROCEDURE — 93010 ELECTROCARDIOGRAM REPORT: CPT

## 2024-06-17 PROCEDURE — 85730 THROMBOPLASTIN TIME PARTIAL: CPT

## 2024-06-17 PROCEDURE — 86900 BLOOD TYPING SEROLOGIC ABO: CPT

## 2024-06-17 PROCEDURE — 71046 X-RAY EXAM CHEST 2 VIEWS: CPT | Mod: 26

## 2024-06-17 PROCEDURE — 86850 RBC ANTIBODY SCREEN: CPT

## 2024-06-17 PROCEDURE — 82375 ASSAY CARBOXYHB QUANT: CPT

## 2024-06-17 PROCEDURE — 86901 BLOOD TYPING SEROLOGIC RH(D): CPT

## 2024-06-17 PROCEDURE — 71046 X-RAY EXAM CHEST 2 VIEWS: CPT

## 2024-06-17 PROCEDURE — 81001 URINALYSIS AUTO W/SCOPE: CPT

## 2024-06-17 PROCEDURE — 83036 HEMOGLOBIN GLYCOSYLATED A1C: CPT

## 2024-06-17 PROCEDURE — 86803 HEPATITIS C AB TEST: CPT

## 2024-06-17 PROCEDURE — 85610 PROTHROMBIN TIME: CPT

## 2024-06-17 PROCEDURE — 93005 ELECTROCARDIOGRAM TRACING: CPT

## 2024-06-17 PROCEDURE — 80048 BASIC METABOLIC PNL TOTAL CA: CPT

## 2024-06-17 PROCEDURE — 99214 OFFICE O/P EST MOD 30 MIN: CPT | Mod: 25

## 2024-06-17 PROCEDURE — 36415 COLL VENOUS BLD VENIPUNCTURE: CPT

## 2024-06-17 PROCEDURE — 82040 ASSAY OF SERUM ALBUMIN: CPT

## 2024-06-18 DIAGNOSIS — Z01.818 ENCOUNTER FOR OTHER PREPROCEDURAL EXAMINATION: ICD-10-CM

## 2024-06-18 LAB
A1C WITH ESTIMATED AVERAGE GLUCOSE RESULT: 5.3 % — SIGNIFICANT CHANGE UP (ref 4–5.6)
ESTIMATED AVERAGE GLUCOSE: 105 MG/DL — SIGNIFICANT CHANGE UP (ref 68–114)
HCV AB S/CO SERPL IA: 0.11 S/CO — SIGNIFICANT CHANGE UP (ref 0–0.99)
HCV AB SERPL-IMP: SIGNIFICANT CHANGE UP

## 2024-06-25 RX ORDER — ONDANSETRON HYDROCHLORIDE 2 MG/ML
4 INJECTION INTRAMUSCULAR; INTRAVENOUS EVERY 6 HOURS
Refills: 0 | Status: DISCONTINUED | OUTPATIENT
Start: 2024-06-26 | End: 2024-06-27

## 2024-06-25 RX ORDER — KETOROLAC TROMETHAMINE 30 MG/ML
30 INJECTION, SOLUTION INTRAMUSCULAR EVERY 6 HOURS
Refills: 0 | Status: DISCONTINUED | OUTPATIENT
Start: 2024-06-26 | End: 2024-06-27

## 2024-06-25 RX ORDER — ACETAMINOPHEN 325 MG
1000 TABLET ORAL EVERY 6 HOURS
Refills: 0 | Status: DISCONTINUED | OUTPATIENT
Start: 2024-06-26 | End: 2024-06-27

## 2024-06-25 RX ORDER — OXYCODONE HYDROCHLORIDE 100 MG/5ML
10 SOLUTION ORAL EVERY 4 HOURS
Refills: 0 | Status: DISCONTINUED | OUTPATIENT
Start: 2024-06-26 | End: 2024-06-27

## 2024-06-25 RX ORDER — OXYCODONE HYDROCHLORIDE 100 MG/5ML
5 SOLUTION ORAL EVERY 4 HOURS
Refills: 0 | Status: DISCONTINUED | OUTPATIENT
Start: 2024-06-26 | End: 2024-06-27

## 2024-06-25 RX ORDER — DEXTROSE MONOHYDRATE AND SODIUM CHLORIDE 5; .3 G/100ML; G/100ML
1000 INJECTION, SOLUTION INTRAVENOUS
Refills: 0 | Status: DISCONTINUED | OUTPATIENT
Start: 2024-06-26 | End: 2024-06-27

## 2024-06-25 RX ORDER — PANTOPRAZOLE SODIUM 40 MG/10ML
40 INJECTION, POWDER, FOR SOLUTION INTRAVENOUS EVERY 24 HOURS
Refills: 0 | Status: DISCONTINUED | OUTPATIENT
Start: 2024-06-26 | End: 2024-06-27

## 2024-06-25 RX ORDER — LORAZEPAM 0.5 MG
0.5 TABLET ORAL ONCE
Refills: 0 | Status: DISCONTINUED | OUTPATIENT
Start: 2024-06-26 | End: 2024-06-27

## 2024-06-25 RX ORDER — ENOXAPARIN SODIUM 100 MG/ML
40 INJECTION SUBCUTANEOUS EVERY 12 HOURS
Refills: 0 | Status: DISCONTINUED | OUTPATIENT
Start: 2024-06-26 | End: 2024-06-27

## 2024-06-26 ENCOUNTER — INPATIENT (INPATIENT)
Facility: HOSPITAL | Age: 44
LOS: 0 days | Discharge: HOME | DRG: 328 | End: 2024-06-27
Attending: SURGERY | Admitting: SURGERY
Payer: COMMERCIAL

## 2024-06-26 VITALS
TEMPERATURE: 97 F | HEART RATE: 52 BPM | WEIGHT: 211.86 LBS | OXYGEN SATURATION: 100 % | SYSTOLIC BLOOD PRESSURE: 113 MMHG | RESPIRATION RATE: 16 BRPM | DIASTOLIC BLOOD PRESSURE: 72 MMHG | HEIGHT: 66 IN

## 2024-06-26 DIAGNOSIS — Z98.84 BARIATRIC SURGERY STATUS: Chronic | ICD-10-CM

## 2024-06-26 DIAGNOSIS — K21.9 GASTRO-ESOPHAGEAL REFLUX DISEASE WITHOUT ESOPHAGITIS: ICD-10-CM

## 2024-06-26 DIAGNOSIS — Z90.3 ACQUIRED ABSENCE OF STOMACH [PART OF]: Chronic | ICD-10-CM

## 2024-06-26 DIAGNOSIS — Z98.890 OTHER SPECIFIED POSTPROCEDURAL STATES: Chronic | ICD-10-CM

## 2024-06-26 LAB
GLUCOSE BLDC GLUCOMTR-MCNC: 107 MG/DL — HIGH (ref 70–99)
GLUCOSE BLDC GLUCOMTR-MCNC: 137 MG/DL — HIGH (ref 70–99)
GLUCOSE BLDC GLUCOMTR-MCNC: 162 MG/DL — HIGH (ref 70–99)
GLUCOSE BLDC GLUCOMTR-MCNC: 77 MG/DL — SIGNIFICANT CHANGE UP (ref 70–99)
HCG UR QL: NEGATIVE — SIGNIFICANT CHANGE UP

## 2024-06-26 PROCEDURE — 99253 IP/OBS CNSLTJ NEW/EST LOW 45: CPT

## 2024-06-26 PROCEDURE — 80048 BASIC METABOLIC PNL TOTAL CA: CPT

## 2024-06-26 PROCEDURE — C9113: CPT

## 2024-06-26 PROCEDURE — 82962 GLUCOSE BLOOD TEST: CPT

## 2024-06-26 PROCEDURE — 85025 COMPLETE CBC W/AUTO DIFF WBC: CPT

## 2024-06-26 PROCEDURE — C9290: CPT

## 2024-06-26 PROCEDURE — 36415 COLL VENOUS BLD VENIPUNCTURE: CPT

## 2024-06-26 RX ORDER — DEXTROSE MONOHYDRATE AND SODIUM CHLORIDE 5; .3 G/100ML; G/100ML
1000 INJECTION, SOLUTION INTRAVENOUS
Refills: 0 | Status: DISCONTINUED | OUTPATIENT
Start: 2024-06-26 | End: 2024-06-26

## 2024-06-26 RX ORDER — ACETAMINOPHEN 325 MG
1000 TABLET ORAL EVERY 6 HOURS
Refills: 0 | Status: COMPLETED | OUTPATIENT
Start: 2024-06-26 | End: 2024-06-26

## 2024-06-26 RX ORDER — APREPITANT 125MG-80MG
80 KIT ORAL ONCE
Refills: 0 | Status: COMPLETED | OUTPATIENT
Start: 2024-06-26 | End: 2024-06-26

## 2024-06-26 RX ORDER — HEPARIN SODIUM 50 [USP'U]/ML
5000 INJECTION, SOLUTION INTRAVENOUS ONCE
Refills: 0 | Status: COMPLETED | OUTPATIENT
Start: 2024-06-26 | End: 2024-06-26

## 2024-06-26 RX ORDER — HYDROMORPHONE HCL 0.2 MG/ML
0.5 INJECTION, SOLUTION INTRAVENOUS
Refills: 0 | Status: DISCONTINUED | OUTPATIENT
Start: 2024-06-26 | End: 2024-06-26

## 2024-06-26 RX ORDER — PANTOPRAZOLE SODIUM 20 MG/1
1 TABLET, DELAYED RELEASE ORAL
Refills: 0 | DISCHARGE

## 2024-06-26 RX ORDER — BUPROPION HYDROCHLORIDE 150 MG/1
1 TABLET, EXTENDED RELEASE ORAL
Refills: 0 | DISCHARGE

## 2024-06-26 RX ORDER — ONDANSETRON HYDROCHLORIDE 2 MG/ML
4 INJECTION INTRAMUSCULAR; INTRAVENOUS ONCE
Refills: 0 | Status: DISCONTINUED | OUTPATIENT
Start: 2024-06-26 | End: 2024-06-26

## 2024-06-26 RX ADMIN — Medication 400 MILLIGRAM(S): at 23:10

## 2024-06-26 RX ADMIN — HYDROMORPHONE HCL 0.5 MILLIGRAM(S): 0.2 INJECTION, SOLUTION INTRAVENOUS at 15:05

## 2024-06-26 RX ADMIN — HYDROMORPHONE HCL 0.5 MILLIGRAM(S): 0.2 INJECTION, SOLUTION INTRAVENOUS at 12:03

## 2024-06-26 RX ADMIN — Medication 400 MILLIGRAM(S): at 17:52

## 2024-06-26 RX ADMIN — Medication 1000 MILLIGRAM(S): at 17:52

## 2024-06-26 RX ADMIN — Medication 1000 MILLIGRAM(S): at 23:10

## 2024-06-26 RX ADMIN — KETOROLAC TROMETHAMINE 30 MILLIGRAM(S): 30 INJECTION, SOLUTION INTRAMUSCULAR at 17:52

## 2024-06-26 RX ADMIN — DEXTROSE MONOHYDRATE AND SODIUM CHLORIDE 150 MILLILITER(S): 5; .3 INJECTION, SOLUTION INTRAVENOUS at 11:29

## 2024-06-26 RX ADMIN — HYDROMORPHONE HCL 0.5 MILLIGRAM(S): 0.2 INJECTION, SOLUTION INTRAVENOUS at 14:56

## 2024-06-26 RX ADMIN — OXYCODONE HYDROCHLORIDE 10 MILLIGRAM(S): 100 SOLUTION ORAL at 21:33

## 2024-06-26 RX ADMIN — OXYCODONE HYDROCHLORIDE 10 MILLIGRAM(S): 100 SOLUTION ORAL at 21:03

## 2024-06-26 RX ADMIN — HYDROMORPHONE HCL 0.5 MILLIGRAM(S): 0.2 INJECTION, SOLUTION INTRAVENOUS at 11:50

## 2024-06-26 RX ADMIN — KETOROLAC TROMETHAMINE 30 MILLIGRAM(S): 30 INJECTION, SOLUTION INTRAMUSCULAR at 23:10

## 2024-06-26 RX ADMIN — PANTOPRAZOLE SODIUM 40 MILLIGRAM(S): 40 INJECTION, POWDER, FOR SOLUTION INTRAVENOUS at 11:29

## 2024-06-26 RX ADMIN — APREPITANT 80 MILLIGRAM(S): KIT at 06:59

## 2024-06-26 RX ADMIN — DEXTROSE MONOHYDRATE AND SODIUM CHLORIDE 150 MILLILITER(S): 5; .3 INJECTION, SOLUTION INTRAVENOUS at 17:52

## 2024-06-26 RX ADMIN — ENOXAPARIN SODIUM 40 MILLIGRAM(S): 100 INJECTION SUBCUTANEOUS at 21:03

## 2024-06-26 RX ADMIN — HEPARIN SODIUM 5000 UNIT(S): 50 INJECTION, SOLUTION INTRAVENOUS at 06:59

## 2024-06-27 ENCOUNTER — TRANSCRIPTION ENCOUNTER (OUTPATIENT)
Age: 44
End: 2024-06-27

## 2024-06-27 VITALS
RESPIRATION RATE: 17 BRPM | TEMPERATURE: 98 F | HEART RATE: 58 BPM | OXYGEN SATURATION: 100 % | SYSTOLIC BLOOD PRESSURE: 120 MMHG | DIASTOLIC BLOOD PRESSURE: 71 MMHG

## 2024-06-27 DIAGNOSIS — K21.9 GASTRO-ESOPHAGEAL REFLUX DISEASE WITHOUT ESOPHAGITIS: ICD-10-CM

## 2024-06-27 LAB
ANION GAP SERPL CALC-SCNC: 5 MMOL/L — SIGNIFICANT CHANGE UP (ref 5–17)
BASOPHILS # BLD AUTO: 0.02 K/UL — SIGNIFICANT CHANGE UP (ref 0–0.2)
BASOPHILS NFR BLD AUTO: 0.3 % — SIGNIFICANT CHANGE UP (ref 0–2)
BUN SERPL-MCNC: 5 MG/DL — LOW (ref 7–23)
CALCIUM SERPL-MCNC: 9.2 MG/DL — SIGNIFICANT CHANGE UP (ref 8.5–10.1)
CHLORIDE SERPL-SCNC: 108 MMOL/L — SIGNIFICANT CHANGE UP (ref 96–108)
CO2 SERPL-SCNC: 28 MMOL/L — SIGNIFICANT CHANGE UP (ref 22–31)
CREAT SERPL-MCNC: 0.81 MG/DL — SIGNIFICANT CHANGE UP (ref 0.5–1.3)
EGFR: 93 ML/MIN/1.73M2 — SIGNIFICANT CHANGE UP
EOSINOPHIL # BLD AUTO: 0.01 K/UL — SIGNIFICANT CHANGE UP (ref 0–0.5)
EOSINOPHIL NFR BLD AUTO: 0.1 % — SIGNIFICANT CHANGE UP (ref 0–6)
GLUCOSE BLDC GLUCOMTR-MCNC: 91 MG/DL — SIGNIFICANT CHANGE UP (ref 70–99)
GLUCOSE BLDC GLUCOMTR-MCNC: 97 MG/DL — SIGNIFICANT CHANGE UP (ref 70–99)
GLUCOSE SERPL-MCNC: 81 MG/DL — SIGNIFICANT CHANGE UP (ref 70–99)
HCT VFR BLD CALC: 32 % — LOW (ref 34.5–45)
HGB BLD-MCNC: 11 G/DL — LOW (ref 11.5–15.5)
IMM GRANULOCYTES NFR BLD AUTO: 0.4 % — SIGNIFICANT CHANGE UP (ref 0–0.9)
LYMPHOCYTES # BLD AUTO: 1.64 K/UL — SIGNIFICANT CHANGE UP (ref 1–3.3)
LYMPHOCYTES # BLD AUTO: 20.6 % — SIGNIFICANT CHANGE UP (ref 13–44)
MCHC RBC-ENTMCNC: 31.8 PG — SIGNIFICANT CHANGE UP (ref 27–34)
MCHC RBC-ENTMCNC: 34.4 GM/DL — SIGNIFICANT CHANGE UP (ref 32–36)
MCV RBC AUTO: 92.5 FL — SIGNIFICANT CHANGE UP (ref 80–100)
MONOCYTES # BLD AUTO: 0.52 K/UL — SIGNIFICANT CHANGE UP (ref 0–0.9)
MONOCYTES NFR BLD AUTO: 6.5 % — SIGNIFICANT CHANGE UP (ref 2–14)
NEUTROPHILS # BLD AUTO: 5.76 K/UL — SIGNIFICANT CHANGE UP (ref 1.8–7.4)
NEUTROPHILS NFR BLD AUTO: 72.1 % — SIGNIFICANT CHANGE UP (ref 43–77)
PLATELET # BLD AUTO: 234 K/UL — SIGNIFICANT CHANGE UP (ref 150–400)
POTASSIUM SERPL-MCNC: 4.1 MMOL/L — SIGNIFICANT CHANGE UP (ref 3.5–5.3)
POTASSIUM SERPL-SCNC: 4.1 MMOL/L — SIGNIFICANT CHANGE UP (ref 3.5–5.3)
RBC # BLD: 3.46 M/UL — LOW (ref 3.8–5.2)
RBC # FLD: 13.1 % — SIGNIFICANT CHANGE UP (ref 10.3–14.5)
SODIUM SERPL-SCNC: 141 MMOL/L — SIGNIFICANT CHANGE UP (ref 135–145)
WBC # BLD: 7.98 K/UL — SIGNIFICANT CHANGE UP (ref 3.8–10.5)
WBC # FLD AUTO: 7.98 K/UL — SIGNIFICANT CHANGE UP (ref 3.8–10.5)

## 2024-06-27 RX ORDER — SEMAGLUTIDE 1.34 MG/ML
0.25 INJECTION, SOLUTION SUBCUTANEOUS
Refills: 0 | DISCHARGE

## 2024-06-27 RX ORDER — ACETAMINOPHEN 325 MG
1000 TABLET ORAL EVERY 6 HOURS
Refills: 0 | Status: COMPLETED | OUTPATIENT
Start: 2024-06-27 | End: 2024-06-27

## 2024-06-27 RX ORDER — TRAMADOL HYDROCHLORIDE 50 MG/1
1 TABLET, FILM COATED ORAL
Qty: 12 | Refills: 0
Start: 2024-06-27 | End: 2024-06-29

## 2024-06-27 RX ADMIN — KETOROLAC TROMETHAMINE 30 MILLIGRAM(S): 30 INJECTION, SOLUTION INTRAMUSCULAR at 05:59

## 2024-06-27 RX ADMIN — Medication 400 MILLIGRAM(S): at 11:04

## 2024-06-27 RX ADMIN — OXYCODONE HYDROCHLORIDE 10 MILLIGRAM(S): 100 SOLUTION ORAL at 05:24

## 2024-06-27 RX ADMIN — Medication 400 MILLIGRAM(S): at 05:59

## 2024-06-27 RX ADMIN — Medication 1000 MILLIGRAM(S): at 05:59

## 2024-06-27 RX ADMIN — ENOXAPARIN SODIUM 40 MILLIGRAM(S): 100 INJECTION SUBCUTANEOUS at 11:04

## 2024-06-27 RX ADMIN — KETOROLAC TROMETHAMINE 30 MILLIGRAM(S): 30 INJECTION, SOLUTION INTRAMUSCULAR at 11:05

## 2024-06-27 RX ADMIN — OXYCODONE HYDROCHLORIDE 10 MILLIGRAM(S): 100 SOLUTION ORAL at 04:54

## 2024-06-27 RX ADMIN — Medication 1000 MILLIGRAM(S): at 11:04

## 2024-06-27 RX ADMIN — PANTOPRAZOLE SODIUM 40 MILLIGRAM(S): 40 INJECTION, POWDER, FOR SOLUTION INTRAVENOUS at 11:05

## 2024-07-03 DIAGNOSIS — F32.A DEPRESSION, UNSPECIFIED: ICD-10-CM

## 2024-07-03 DIAGNOSIS — F17.200 NICOTINE DEPENDENCE, UNSPECIFIED, UNCOMPLICATED: ICD-10-CM

## 2024-07-03 DIAGNOSIS — K21.00 GASTRO-ESOPHAGEAL REFLUX DISEASE WITH ESOPHAGITIS, WITHOUT BLEEDING: ICD-10-CM

## 2024-07-03 DIAGNOSIS — K44.9 DIAPHRAGMATIC HERNIA WITHOUT OBSTRUCTION OR GANGRENE: ICD-10-CM

## 2024-07-03 DIAGNOSIS — F41.9 ANXIETY DISORDER, UNSPECIFIED: ICD-10-CM

## 2024-07-03 DIAGNOSIS — E66.01 MORBID (SEVERE) OBESITY DUE TO EXCESS CALORIES: ICD-10-CM
